# Patient Record
Sex: FEMALE | Race: WHITE | NOT HISPANIC OR LATINO | Employment: FULL TIME | URBAN - METROPOLITAN AREA
[De-identification: names, ages, dates, MRNs, and addresses within clinical notes are randomized per-mention and may not be internally consistent; named-entity substitution may affect disease eponyms.]

---

## 2023-05-30 ENCOUNTER — APPOINTMENT (EMERGENCY)
Dept: RADIOLOGY | Facility: HOSPITAL | Age: 28
End: 2023-05-30
Payer: COMMERCIAL

## 2023-05-30 ENCOUNTER — HOSPITAL ENCOUNTER (EMERGENCY)
Facility: HOSPITAL | Age: 28
Discharge: HOME | End: 2023-05-30
Attending: EMERGENCY MEDICINE
Payer: COMMERCIAL

## 2023-05-30 VITALS
OXYGEN SATURATION: 99 % | HEIGHT: 62 IN | HEART RATE: 91 BPM | DIASTOLIC BLOOD PRESSURE: 80 MMHG | TEMPERATURE: 97.5 F | SYSTOLIC BLOOD PRESSURE: 126 MMHG | WEIGHT: 235 LBS | RESPIRATION RATE: 18 BRPM | BODY MASS INDEX: 43.24 KG/M2

## 2023-05-30 DIAGNOSIS — D25.9 UTERINE LEIOMYOMA, UNSPECIFIED LOCATION: ICD-10-CM

## 2023-05-30 DIAGNOSIS — N83.201 CYST OF RIGHT OVARY: Primary | ICD-10-CM

## 2023-05-30 LAB
ALBUMIN SERPL-MCNC: 4.2 G/DL (ref 3.4–5)
ALP SERPL-CCNC: 61 IU/L (ref 35–126)
ALT SERPL-CCNC: 18 IU/L (ref 11–54)
ANION GAP SERPL CALC-SCNC: 8 MEQ/L (ref 3–15)
AST SERPL-CCNC: 21 IU/L (ref 15–41)
BASOPHILS # BLD: 0.03 K/UL (ref 0.01–0.1)
BASOPHILS NFR BLD: 0.3 %
BILIRUB SERPL-MCNC: 0.7 MG/DL (ref 0.3–1.2)
BILIRUB UR QL STRIP.AUTO: NEGATIVE MG/DL
BUN SERPL-MCNC: 11 MG/DL (ref 8–20)
CALCIUM SERPL-MCNC: 8.9 MG/DL (ref 8.9–10.3)
CHLORIDE SERPL-SCNC: 101 MEQ/L (ref 98–109)
CLARITY UR REFRACT.AUTO: CLEAR
CO2 SERPL-SCNC: 25 MEQ/L (ref 22–32)
COLOR UR AUTO: COLORLESS
CREAT SERPL-MCNC: 0.8 MG/DL (ref 0.6–1.1)
DIFFERENTIAL METHOD BLD: ABNORMAL
EOSINOPHIL # BLD: 0.14 K/UL (ref 0.04–0.36)
EOSINOPHIL NFR BLD: 1.4 %
ERYTHROCYTE [DISTWIDTH] IN BLOOD BY AUTOMATED COUNT: 12.1 % (ref 11.7–14.4)
GFR SERPL CREATININE-BSD FRML MDRD: >60 ML/MIN/1.73M*2
GLUCOSE SERPL-MCNC: 96 MG/DL (ref 70–99)
GLUCOSE UR STRIP.AUTO-MCNC: NEGATIVE MG/DL
HCG UR QL: NEGATIVE
HCT VFR BLDCO AUTO: 39.9 % (ref 35–45)
HGB BLD-MCNC: 13 G/DL (ref 11.8–15.7)
HGB UR QL STRIP.AUTO: NEGATIVE
IMM GRANULOCYTES # BLD AUTO: 0.06 K/UL (ref 0–0.08)
IMM GRANULOCYTES NFR BLD AUTO: 0.6 %
KETONES UR STRIP.AUTO-MCNC: ABNORMAL MG/DL
LEUKOCYTE ESTERASE UR QL STRIP.AUTO: NEGATIVE
LIPASE SERPL-CCNC: 23 U/L (ref 20–51)
LYMPHOCYTES # BLD: 2.15 K/UL (ref 1.2–3.5)
LYMPHOCYTES NFR BLD: 21.2 %
MCH RBC QN AUTO: 30.2 PG (ref 28–33.2)
MCHC RBC AUTO-ENTMCNC: 32.6 G/DL (ref 32.2–35.5)
MCV RBC AUTO: 92.8 FL (ref 83–98)
MONOCYTES # BLD: 0.6 K/UL (ref 0.28–0.8)
MONOCYTES NFR BLD: 5.9 %
NEUTROPHILS # BLD: 7.18 K/UL (ref 1.7–7)
NEUTS SEG NFR BLD: 70.6 %
NITRITE UR QL STRIP.AUTO: NEGATIVE
NRBC BLD-RTO: 0 %
PDW BLD AUTO: 11.1 FL (ref 9.4–12.3)
PH UR STRIP.AUTO: 6.5 [PH]
PLATELET # BLD AUTO: 249 K/UL (ref 150–369)
POTASSIUM SERPL-SCNC: 3.5 MEQ/L (ref 3.6–5.1)
PROT SERPL-MCNC: 8 G/DL (ref 6–8.2)
PROT UR QL STRIP.AUTO: NEGATIVE
RBC # BLD AUTO: 4.3 M/UL (ref 3.93–5.22)
SODIUM SERPL-SCNC: 134 MEQ/L (ref 136–144)
SP GR UR REFRACT.AUTO: >1.035
UROBILINOGEN UR STRIP-ACNC: 0.2 EU/DL
WBC # BLD AUTO: 10.16 K/UL (ref 3.8–10.5)

## 2023-05-30 PROCEDURE — 25800000 HC PHARMACY IV SOLUTIONS

## 2023-05-30 PROCEDURE — 3E0333Z INTRODUCTION OF ANTI-INFLAMMATORY INTO PERIPHERAL VEIN, PERCUTANEOUS APPROACH: ICD-10-PCS | Performed by: EMERGENCY MEDICINE

## 2023-05-30 PROCEDURE — 76857 US EXAM PELVIC LIMITED: CPT

## 2023-05-30 PROCEDURE — 36415 COLL VENOUS BLD VENIPUNCTURE: CPT

## 2023-05-30 PROCEDURE — 99284 EMERGENCY DEPT VISIT MOD MDM: CPT | Mod: 25

## 2023-05-30 PROCEDURE — G1004 CDSM NDSC: HCPCS

## 2023-05-30 PROCEDURE — 63600000 HC DRUGS/DETAIL CODE

## 2023-05-30 PROCEDURE — 96361 HYDRATE IV INFUSION ADD-ON: CPT | Mod: 59

## 2023-05-30 PROCEDURE — 81003 URINALYSIS AUTO W/O SCOPE: CPT | Performed by: EMERGENCY MEDICINE

## 2023-05-30 PROCEDURE — 76830 TRANSVAGINAL US NON-OB: CPT

## 2023-05-30 PROCEDURE — 96374 THER/PROPH/DIAG INJ IV PUSH: CPT | Mod: 59

## 2023-05-30 PROCEDURE — 63600105 HC IODINE BASED CONTRAST

## 2023-05-30 PROCEDURE — 83690 ASSAY OF LIPASE: CPT

## 2023-05-30 PROCEDURE — 93975 VASCULAR STUDY: CPT

## 2023-05-30 PROCEDURE — 84703 CHORIONIC GONADOTROPIN ASSAY: CPT | Performed by: EMERGENCY MEDICINE

## 2023-05-30 PROCEDURE — 80053 COMPREHEN METABOLIC PANEL: CPT | Performed by: EMERGENCY MEDICINE

## 2023-05-30 PROCEDURE — 85025 COMPLETE CBC W/AUTO DIFF WBC: CPT | Performed by: EMERGENCY MEDICINE

## 2023-05-30 RX ORDER — NAPROXEN 500 MG/1
500 TABLET ORAL 2 TIMES DAILY WITH MEALS
Qty: 12 TABLET | Refills: 0 | Status: SHIPPED | OUTPATIENT
Start: 2023-05-30 | End: 2023-06-05

## 2023-05-30 RX ORDER — KETOROLAC TROMETHAMINE 15 MG/ML
15 INJECTION, SOLUTION INTRAMUSCULAR; INTRAVENOUS ONCE
Status: COMPLETED | OUTPATIENT
Start: 2023-05-30 | End: 2023-05-30

## 2023-05-30 RX ADMIN — SODIUM CHLORIDE 1000 ML: 9 INJECTION, SOLUTION INTRAVENOUS at 18:49

## 2023-05-30 RX ADMIN — KETOROLAC TROMETHAMINE 15 MG: 15 INJECTION, SOLUTION INTRAMUSCULAR; INTRAVENOUS at 20:52

## 2023-05-30 RX ADMIN — IOHEXOL 100 ML: 350 INJECTION, SOLUTION INTRAVENOUS at 19:45

## 2023-05-30 ASSESSMENT — ENCOUNTER SYMPTOMS
DIARRHEA: 0
SHORTNESS OF BREATH: 0
WEAKNESS: 0
FREQUENCY: 0
BLOOD IN STOOL: 0
ARTHRALGIAS: 0
CHILLS: 0
MYALGIAS: 0
LIGHT-HEADEDNESS: 0
VOMITING: 0
NAUSEA: 0
DYSURIA: 0
HEMATURIA: 0
APPETITE CHANGE: 0
RHINORRHEA: 0
SORE THROAT: 0
ABDOMINAL PAIN: 1
BACK PAIN: 0
FEVER: 0

## 2023-05-30 NOTE — ED PROVIDER NOTES
Emergency Medicine Note  HPI   HISTORY OF PRESENT ILLNESS     29 y/o F without signif PMHx presents to the ED for evaluation of abd pain.  Pt states she awoke from sleep with sharp stabbing pain in lower abdomen.  Pain initially in right lower abdomen which migrated to LLQ.  Pain waxes and wanes.  Worse with lying flat.  Pt suspects pain may be related to leo she ate yesterday.  Denies fevers, N/V/D, black or bloody stools, dysuria, hematuria, urinary frequency, vaginal d/c or bleeding.  Similar episode of sx in the past for which pt had CT and most recently colonoscopy 2 weeks ago which was negative.  No hx ovarian cysts.  +IUD in place - does not regularly get period.  No prev abd surgeries.        History provided by:  Patient   used: No          Patient History   PAST HISTORY     Reviewed from Nursing Triage:       No past medical history on file.    No past surgical history on file.    No family history on file.           Review of Systems   REVIEW OF SYSTEMS     Review of Systems   Constitutional: Negative for appetite change, chills and fever.   HENT: Negative for congestion, rhinorrhea and sore throat.    Respiratory: Negative for shortness of breath.    Cardiovascular: Negative for chest pain.   Gastrointestinal: Positive for abdominal pain. Negative for blood in stool, diarrhea, nausea and vomiting.   Genitourinary: Negative for dysuria, frequency, hematuria, vaginal bleeding and vaginal discharge.   Musculoskeletal: Negative for arthralgias, back pain and myalgias.   Skin: Negative for rash.   Neurological: Negative for syncope, weakness and light-headedness.         VITALS     ED Vitals    Date/Time Temp Pulse Resp BP SpO2 [facility]   05/30/23 2229 36.4 °C (97.5 °F) 91 18 126/80 99 % KAK   05/30/23 1956 36.7 °C (98 °F) 97 18 139/74 99 % KAK   05/30/23 1743 36.6 °C (97.9 °F) 106 18 123/78 98 % CW        Pulse Ox %: 98 % (05/30/23 1818)  Pulse Ox Interpretation: Normal (05/30/23 1818)            Physical Exam   PHYSICAL EXAM     Physical Exam  Vitals and nursing note reviewed.   Constitutional:       General: She is not in acute distress.     Appearance: Normal appearance. She is well-developed.   HENT:      Head: Normocephalic.   Eyes:      Conjunctiva/sclera: Conjunctivae normal.   Cardiovascular:      Rate and Rhythm: Normal rate and regular rhythm.      Heart sounds: Normal heart sounds.   Pulmonary:      Effort: Pulmonary effort is normal.      Breath sounds: Normal breath sounds.   Abdominal:      Palpations: Abdomen is soft.      Tenderness: There is abdominal tenderness in the suprapubic area and left lower quadrant. There is no right CVA tenderness, left CVA tenderness, guarding or rebound.   Musculoskeletal:         General: Normal range of motion.      Cervical back: Neck supple.   Skin:     General: Skin is warm and dry.      Findings: No rash.   Neurological:      Mental Status: She is alert and oriented to person, place, and time. Mental status is at baseline.           PROCEDURES     Procedures     DATA     Results     Procedure Component Value Units Date/Time    UA with reflex culture [[id number]]  (Abnormal) Collected: 05/30/23 2012    Specimen: Urine, Clean Catch Updated: 05/30/23 2021    Narrative:      The following orders were created for panel order UA with reflex culture.  Procedure                               Abnormality         Status                     ---------                               -----------         ------                     UA Reflex to Culture (Ma...[[id number]]  Abnormal            Final result                 Please view results for these tests on the individual orders.    UA Reflex to Culture (Macroscopic) [[id number]]  (Abnormal) Collected: 05/30/23 2012    Specimen: Urine, Clean Catch Updated: 05/30/23 2021     Color, Urine Colorless     Clarity, Urine Clear     Specific Gravity, Urine >1.035     pH, Urine 6.5     Leukocyte Esterase Negative     Comment:  Results can be falsely negative due to high specific gravity, some antibiotics, glucose >3 g/dl, or WBC other than neutrophils.        Nitrite, Urine Negative     Protein, Urine Negative     Glucose, Urine Negative mg/dL      Ketones, Urine Trace mg/dL      Comment: Free sulfhydryl drugs such as Mesna, Capoten, and Acetylcysteine (Mucomyst) may cause false positive ketonuria.        Urobilinogen, Urine 0.2 EU/dL      Bilirubin, Urine Negative mg/dL      Blood, Urine Negative     Comment: The sensitivity of the occult blood test is equivalent to approximately 4 intact RBC/HPF.       Lipase [[id number]]  (Normal) Collected: 05/30/23 1755    Specimen: Blood, Venous Updated: 05/30/23 1912     Lipase 23 U/L     Comprehensive metabolic panel [[id number]]  (Abnormal) Collected: 05/30/23 1755    Specimen: Blood, Venous Updated: 05/30/23 1854     Sodium 134 mEQ/L      Potassium 3.5 mEQ/L      Comment: Results obtained on plasma. Plasma Potassium values may be up to 0.4 mEQ/L less than serum values. The differences may be greater for patients with high platelet or white cell counts.        Chloride 101 mEQ/L      CO2 25 mEQ/L      BUN 11 mg/dL      Creatinine 0.8 mg/dL      Glucose 96 mg/dL      Calcium 8.9 mg/dL      AST (SGOT) 21 IU/L      ALT (SGPT) 18 IU/L      Alkaline Phosphatase 61 IU/L      Total Protein 8.0 g/dL      Comment: Test performed on plasma which typically contains approximately 0.4 g/dL more protein than serum.        Albumin 4.2 g/dL      Bilirubin, Total 0.7 mg/dL      eGFR >60.0 mL/min/1.73m*2      Anion Gap 8 mEQ/L     BhCG, Serum, Qual [[id number]]  (Normal) Collected: 05/30/23 1755    Specimen: Blood, Venous Updated: 05/30/23 1849     Preg Test, Serum Negative    CBC and differential [[id number]]  (Abnormal) Collected: 05/30/23 1755    Specimen: Blood, Venous Updated: 05/30/23 1818     WBC 10.16 K/uL      RBC 4.30 M/uL      Hemoglobin 13.0 g/dL      Hematocrit 39.9 %      MCV 92.8 fL      MCH 30.2 pg       MCHC 32.6 g/dL      RDW 12.1 %      Platelets 249 K/uL      MPV 11.1 fL      Differential Type Auto     nRBC 0.0 %      Immature Granulocytes 0.6 %      Neutrophils 70.6 %      Lymphocytes 21.2 %      Monocytes 5.9 %      Eosinophils 1.4 %      Basophils 0.3 %      Immature Granulocytes, Absolute 0.06 K/uL      Neutrophils, Absolute 7.18 K/uL      Lymphocytes, Absolute 2.15 K/uL      Monocytes, Absolute 0.60 K/uL      Eosinophils, Absolute 0.14 K/uL      Basophils, Absolute 0.03 K/uL           Imaging Results          ULTRASOUND PELVIS LIMITED TRANSABDOMINAL ONLY (Final result)  Result time 05/30/23 21:56:45    Final result                 Impression:    IMPRESSION:  No evidence for ovarian torsion.  Simple cyst in the right ovary.  1.2 cm left mid body intramural fibroid.               Narrative:      CLINICAL HISTORY:  cyst on CT    COMPARISON: CT performed earlier in the day    COMMENT: Transabdominal limited pelvic ultrasound and transvaginal examination  of the pelvis is performed.  Transabdominal ultrasound was performed to allow a  broad examination of the pelvis to visualize structures that cannot be seen with  transvaginal imaging alone.  Transvaginal scans were performed for better  delineation of the pelvic structures, particularly the endometrium and adnexa.  Dedicated Doppler imaging of the ovaries is also performed.    Uterus: The uterus measures 4.0 x  4.6 x  8.3 cm. The parenchyma is  heterogeneous with a left mid body intramural fibroid measuring 1.2 cm in  maximal dimension.  Endometrium: The endometrial stripe measures 0.9   in  maximum thickness.  There is no endometrial mass or abnormal vascularity of the  endometrium.  IUD in satisfactory position.    Right ovary: The right ovary measures 3.3  x 3.5 x 4.5 cm.  There is a simple  cyst in the right ovary which measures 1.9 x 2.0 x 1.9 cm.  Normal arterial and  venous waveforms in the right ovary.    Left ovary: The left ovary measures 1.3 x 2.4  x 2.5  cm.   The left ovary is  normal in size and echotexture.. Arterial and venous waveforms are identified in  the left of    Peritoneum: There is small simple free fluid in the pelvis.    Additional findings: Ovaries best seen transvaginally                               ULTRASOUND PELVIS TRANSVAGINAL ONLY (Final result)  Result time 05/30/23 21:56:45    Final result                 Impression:    IMPRESSION:  No evidence for ovarian torsion.  Simple cyst in the right ovary.  1.2 cm left mid body intramural fibroid.               Narrative:      CLINICAL HISTORY:  cyst on CT    COMPARISON: CT performed earlier in the day    COMMENT: Transabdominal limited pelvic ultrasound and transvaginal examination  of the pelvis is performed.  Transabdominal ultrasound was performed to allow a  broad examination of the pelvis to visualize structures that cannot be seen with  transvaginal imaging alone.  Transvaginal scans were performed for better  delineation of the pelvic structures, particularly the endometrium and adnexa.  Dedicated Doppler imaging of the ovaries is also performed.    Uterus: The uterus measures 4.0 x  4.6 x  8.3 cm. The parenchyma is  heterogeneous with a left mid body intramural fibroid measuring 1.2 cm in  maximal dimension.  Endometrium: The endometrial stripe measures 0.9   in  maximum thickness.  There is no endometrial mass or abnormal vascularity of the  endometrium.  IUD in satisfactory position.    Right ovary: The right ovary measures 3.3  x 3.5 x 4.5 cm.  There is a simple  cyst in the right ovary which measures 1.9 x 2.0 x 1.9 cm.  Normal arterial and  venous waveforms in the right ovary.    Left ovary: The left ovary measures 1.3 x 2.4 x 2.5  cm.   The left ovary is  normal in size and echotexture.. Arterial and venous waveforms are identified in  the left of    Peritoneum: There is small simple free fluid in the pelvis.    Additional findings: Ovaries best seen transvaginally                                ULTRASOUND DOPPLER (Final result)  Result time 05/30/23 21:56:45    Final result                 Impression:    IMPRESSION:  No evidence for ovarian torsion.  Simple cyst in the right ovary.  1.2 cm left mid body intramural fibroid.               Narrative:      CLINICAL HISTORY:  cyst on CT    COMPARISON: CT performed earlier in the day    COMMENT: Transabdominal limited pelvic ultrasound and transvaginal examination  of the pelvis is performed.  Transabdominal ultrasound was performed to allow a  broad examination of the pelvis to visualize structures that cannot be seen with  transvaginal imaging alone.  Transvaginal scans were performed for better  delineation of the pelvic structures, particularly the endometrium and adnexa.  Dedicated Doppler imaging of the ovaries is also performed.    Uterus: The uterus measures 4.0 x  4.6 x  8.3 cm. The parenchyma is  heterogeneous with a left mid body intramural fibroid measuring 1.2 cm in  maximal dimension.  Endometrium: The endometrial stripe measures 0.9   in  maximum thickness.  There is no endometrial mass or abnormal vascularity of the  endometrium.  IUD in satisfactory position.    Right ovary: The right ovary measures 3.3  x 3.5 x 4.5 cm.  There is a simple  cyst in the right ovary which measures 1.9 x 2.0 x 1.9 cm.  Normal arterial and  venous waveforms in the right ovary.    Left ovary: The left ovary measures 1.3 x 2.4 x 2.5  cm.   The left ovary is  normal in size and echotexture.. Arterial and venous waveforms are identified in  the left of    Peritoneum: There is small simple free fluid in the pelvis.    Additional findings: Ovaries best seen transvaginally                               CT ABDOMEN PELVIS WITH IV CONTRAST (Final result)  Result time 05/30/23 20:04:49    Final result                 Impression:    IMPRESSION:  1.  No evidence for acute appendicitis or diverticulitis.  2.  Small amount of complex free fluid in the pelvis with a  2.8 cm right adnexal  cyst.  Further assessment can be considered with pelvic ultrasound if clinically  appropriate.    COMMENT:    Technique: CT examination of the abdomen and pelvis was performed utilizing  contiguous 2.5 mm transaxial sections. Images were acquired  following the  administration of 100 cc Omnipaque 350 intravenous contrast.  Post contrast  imaging was obtained in the arterial and venous phases of enhancement.  Oral  contrast was not administered.  Coronal and sagittal reformations are obtained.      CT DOSE:  One or more dose reduction techniques (e.g. automated exposure  control, adjustment of the mA and/or kV according to patient size, use of  iterative reconstruction technique) utilized for this examination    Comparison studies: None.    Lung bases: Lung bases are clear    Liver: The liver is normal in size.  There is no focal mass.  Hepatic veins and  portal veins are patent without focal filling defects to suggest thrombosis..    Gallbladder:  No gallstones.  No gallbladder wall thickening..    Spleen: Spleen is normal in size without focal mass lesion..    Pancreas: The pancreas is normal without focal mass, parenchymal atrophy, or  pancreatic duct dilation..    Adrenals: The adrenals are normal bilaterally without focal mass..    Kidneys, ureters and bladder: No hydronephrosis.  No renal calculi.  No evidence  for solid mass lesion.  The bladder is normal..    Retroperitoneal structures: There is no abdominal aortic aneurysm.  There is no  retroperitoneal adenopathy or retroperitoneal mass..    Bowel and mesentery: No evidence of a focal inflammatory or obstructive process.  Normal appendix.  No diverticulitis.    Lymph nodes: No pathologic lymphadenopathy..    Pelvis: The uterus is normal in size.  IUD in place.  2.8 cm right ovarian cyst.  There is a small amount of pelvic free fluid.  This measures above simple fluid  density in keeping with a complex fluid.    Bones: Within normal  limits.             Narrative:    CLINICAL HISTORY: LLQ abdominal pain                                No orders to display       Scoring tools                                  ED Course & MDM   MDM / ED COURSE / CLINICAL IMPRESSION / DISPO     Medical Decision Making  Cyst of right ovary: acute illness or injury  Uterine leiomyoma, unspecified location: acute illness or injury  Amount and/or Complexity of Data Reviewed  Labs: ordered.  Radiology: ordered. Decision-making details documented in ED Course.      Risk  Prescription drug management.          ED Course as of 05/30/23 2302   Tue May 30, 2023   1850 Declines antiemetics/analgesia [EG]   2022 CT ABDOMEN PELVIS WITH IV CONTRAST  CLINICAL HISTORY: LLQ abdominal pain     --  IMPRESSION:  1.  No evidence for acute appendicitis or diverticulitis.  2.  Small amount of complex free fluid in the pelvis with a 2.8 cm right adnexal  cyst.  Further assessment can be considered with pelvic ultrasound if clinically  appropriate.    [EG]   2109 Updated - awaiting US.  Toradol ordered.   [EG]   2209 ULTRASOUND PELVIS TRANSVAGINAL ONLY     --  IMPRESSION:  No evidence for ovarian torsion.  Simple cyst in the right ovary.  1.2 cm left mid body intramural fibroid. [EG]   2220 Patient updated on the results of work up.  Agreeable with plan for discharge home with close follow up.  Strict return precautions discussed.     [EG]      ED Course User Index  [EG] Ella Huang PA C     Clinical Impression      Cyst of right ovary   Uterine leiomyoma, unspecified location     _________________     ED Disposition   Discharge                   Ella Huang PA C  05/30/23 2303

## 2023-05-31 NOTE — DISCHARGE INSTRUCTIONS
Follow-up with your primary care doctor and OB/GYN for further monitoring of symptoms.  Take ibuprofen or Tylenol as needed for pain.  Rest.  Apply heating pad or ice to the area.  Return to the ER for evaluation of worsening abdominal pain, fevers, vomiting, inability to eat or drink, chest pain, shortness of breath or any other concerns.

## 2023-06-01 NOTE — ED ATTESTATION NOTE
I have personally seen and examined the patient.  I personally performed the key components of the encounter and provided a substantive portion of the care and medical decision making for this patient.  I reviewed and agree with physician assistant / nurse practitioner’s assessment and plan of care, with the following exceptions: None  My examination, assessment, and plan of care of Rena Nogueira is as follows:     Exam: After ultrasound, well-appearing, no acute distress awake alert oriented x3, abdomen soft, mild right lower quadrant tenderness to palpation without guarding or rebound, normal pulses    Assessment and plan: Patient presents with lower abdominal pain, right greater than left that is been constant since it started though it waxes and wanes, she has had similar symptoms previously though more brief in nature, she associates the pain with eating leo, lab work reassuring, CT shows small amount of complex free fluid in the abdomen with right adnexal cyst, ultrasound performed showing right ovarian cyst, symptomatic treatment and outpatient follow-up given strict return and follow-up instructions    Differential diagnoses considered but not limited to, include: Ovarian cyst, ovarian torsion, kidney stone, appendicitis, enteritis     Brad Peralta, DO  05/31/23 5450

## 2023-11-06 ENCOUNTER — APPOINTMENT (EMERGENCY)
Dept: RADIOLOGY | Facility: HOSPITAL | Age: 28
End: 2023-11-06
Payer: COMMERCIAL

## 2023-11-06 ENCOUNTER — HOSPITAL ENCOUNTER (EMERGENCY)
Facility: HOSPITAL | Age: 28
Discharge: HOME | End: 2023-11-06
Attending: EMERGENCY MEDICINE
Payer: COMMERCIAL

## 2023-11-06 VITALS
SYSTOLIC BLOOD PRESSURE: 133 MMHG | TEMPERATURE: 98.6 F | WEIGHT: 230 LBS | RESPIRATION RATE: 20 BRPM | HEART RATE: 94 BPM | DIASTOLIC BLOOD PRESSURE: 77 MMHG | BODY MASS INDEX: 40.75 KG/M2 | HEIGHT: 63 IN | OXYGEN SATURATION: 95 %

## 2023-11-06 DIAGNOSIS — S29.011A MUSCLE STRAIN OF CHEST WALL, INITIAL ENCOUNTER: Primary | ICD-10-CM

## 2023-11-06 DIAGNOSIS — R05.1 ACUTE COUGH: ICD-10-CM

## 2023-11-06 LAB
ALBUMIN SERPL-MCNC: 4.2 G/DL (ref 3.5–5.7)
ALP SERPL-CCNC: 66 IU/L (ref 34–125)
ALT SERPL-CCNC: 14 IU/L (ref 7–52)
ANION GAP SERPL CALC-SCNC: 9 MEQ/L (ref 3–15)
AST SERPL-CCNC: 18 IU/L (ref 13–39)
ATRIAL RATE: 104
BASOPHILS # BLD: 0.05 K/UL (ref 0.01–0.1)
BASOPHILS NFR BLD: 0.4 %
BILIRUB SERPL-MCNC: 0.7 MG/DL (ref 0.3–1.2)
BUN SERPL-MCNC: 10 MG/DL (ref 7–25)
CALCIUM SERPL-MCNC: 9.3 MG/DL (ref 8.6–10.3)
CHLORIDE SERPL-SCNC: 102 MEQ/L (ref 98–107)
CO2 SERPL-SCNC: 27 MEQ/L (ref 21–31)
CREAT SERPL-MCNC: 0.8 MG/DL (ref 0.6–1.2)
D DIMER PPP IA.FEU-MCNC: 0.46 UG/ML FEU (ref 0–0.5)
DIFFERENTIAL METHOD BLD: ABNORMAL
EOSINOPHIL # BLD: 0.54 K/UL (ref 0.04–0.36)
EOSINOPHIL NFR BLD: 4 %
ERYTHROCYTE [DISTWIDTH] IN BLOOD BY AUTOMATED COUNT: 12.5 % (ref 11.7–14.4)
FLUAV RNA SPEC QL NAA+PROBE: NEGATIVE
FLUBV RNA SPEC QL NAA+PROBE: NEGATIVE
GFR SERPL CREATININE-BSD FRML MDRD: >60 ML/MIN/1.73M*2
GLUCOSE SERPL-MCNC: 95 MG/DL (ref 70–99)
HCG UR QL: NEGATIVE
HCT VFR BLDCO AUTO: 43.1 % (ref 35–45)
HGB BLD-MCNC: 13.7 G/DL (ref 11.8–15.7)
IMM GRANULOCYTES # BLD AUTO: 0.1 K/UL (ref 0–0.08)
IMM GRANULOCYTES NFR BLD AUTO: 0.7 %
LYMPHOCYTES # BLD: 3.46 K/UL (ref 1.2–3.5)
LYMPHOCYTES NFR BLD: 25.6 %
MCH RBC QN AUTO: 29.6 PG (ref 28–33.2)
MCHC RBC AUTO-ENTMCNC: 31.8 G/DL (ref 32.2–35.5)
MCV RBC AUTO: 93.1 FL (ref 83–98)
MONOCYTES # BLD: 0.94 K/UL (ref 0.28–0.8)
MONOCYTES NFR BLD: 7 %
NEUTROPHILS # BLD: 8.42 K/UL (ref 1.7–7)
NEUTS SEG NFR BLD: 62.3 %
NRBC BLD-RTO: 0 %
P AXIS: 68
PDW BLD AUTO: 10.7 FL (ref 9.4–12.3)
PLATELET # BLD AUTO: 286 K/UL (ref 150–369)
POTASSIUM SERPL-SCNC: 3.8 MEQ/L (ref 3.5–5.1)
PR INTERVAL: 130
PROT SERPL-MCNC: 7.7 G/DL (ref 6–8.2)
QRS DURATION: 74
QT INTERVAL: 338
QTC CALCULATION(BAZETT): 444
R AXIS: 78
RBC # BLD AUTO: 4.63 M/UL (ref 3.93–5.22)
RSV RNA SPEC QL NAA+PROBE: NEGATIVE
SARS-COV-2 RNA RESP QL NAA+PROBE: NEGATIVE
SODIUM SERPL-SCNC: 138 MEQ/L (ref 136–145)
T WAVE AXIS: 9
TROPONIN I SERPL HS-MCNC: 3.3 PG/ML
VENTRICULAR RATE: 104
WBC # BLD AUTO: 13.51 K/UL (ref 3.8–10.5)

## 2023-11-06 PROCEDURE — 71046 X-RAY EXAM CHEST 2 VIEWS: CPT

## 2023-11-06 PROCEDURE — 85379 FIBRIN DEGRADATION QUANT: CPT | Performed by: PHYSICIAN ASSISTANT

## 2023-11-06 PROCEDURE — 3E0333Z INTRODUCTION OF ANTI-INFLAMMATORY INTO PERIPHERAL VEIN, PERCUTANEOUS APPROACH: ICD-10-PCS | Performed by: EMERGENCY MEDICINE

## 2023-11-06 PROCEDURE — 87637 SARSCOV2&INF A&B&RSV AMP PRB: CPT | Performed by: PHYSICIAN ASSISTANT

## 2023-11-06 PROCEDURE — 84703 CHORIONIC GONADOTROPIN ASSAY: CPT | Performed by: EMERGENCY MEDICINE

## 2023-11-06 PROCEDURE — 63700000 HC SELF-ADMINISTRABLE DRUG: Performed by: PHYSICIAN ASSISTANT

## 2023-11-06 PROCEDURE — 96374 THER/PROPH/DIAG INJ IV PUSH: CPT

## 2023-11-06 PROCEDURE — 99284 EMERGENCY DEPT VISIT MOD MDM: CPT | Mod: 25

## 2023-11-06 PROCEDURE — 93005 ELECTROCARDIOGRAM TRACING: CPT | Performed by: EMERGENCY MEDICINE

## 2023-11-06 PROCEDURE — 80053 COMPREHEN METABOLIC PANEL: CPT | Performed by: EMERGENCY MEDICINE

## 2023-11-06 PROCEDURE — 63600000 HC DRUGS/DETAIL CODE: Performed by: PHYSICIAN ASSISTANT

## 2023-11-06 PROCEDURE — 85025 COMPLETE CBC W/AUTO DIFF WBC: CPT | Performed by: EMERGENCY MEDICINE

## 2023-11-06 PROCEDURE — 96376 TX/PRO/DX INJ SAME DRUG ADON: CPT

## 2023-11-06 PROCEDURE — 84484 ASSAY OF TROPONIN QUANT: CPT | Performed by: EMERGENCY MEDICINE

## 2023-11-06 PROCEDURE — 36415 COLL VENOUS BLD VENIPUNCTURE: CPT | Performed by: PHYSICIAN ASSISTANT

## 2023-11-06 RX ORDER — LIDOCAINE 560 MG/1
1 PATCH PERCUTANEOUS; TOPICAL; TRANSDERMAL ONCE
Status: DISCONTINUED | OUTPATIENT
Start: 2023-11-06 | End: 2023-11-06 | Stop reason: HOSPADM

## 2023-11-06 RX ORDER — ALBUTEROL SULFATE 90 UG/1
2 INHALANT RESPIRATORY (INHALATION) ONCE
Status: DISCONTINUED | OUTPATIENT
Start: 2023-11-06 | End: 2023-11-06

## 2023-11-06 RX ORDER — ACETAMINOPHEN 325 MG/1
975 TABLET ORAL ONCE
Status: COMPLETED | OUTPATIENT
Start: 2023-11-06 | End: 2023-11-06

## 2023-11-06 RX ORDER — DIAZEPAM 5 MG/1
5 TABLET ORAL ONCE
Status: COMPLETED | OUTPATIENT
Start: 2023-11-06 | End: 2023-11-06

## 2023-11-06 RX ORDER — KETOROLAC TROMETHAMINE 15 MG/ML
15 INJECTION, SOLUTION INTRAMUSCULAR; INTRAVENOUS ONCE
Status: COMPLETED | OUTPATIENT
Start: 2023-11-06 | End: 2023-11-06

## 2023-11-06 RX ORDER — DIAZEPAM 5 MG/1
5 TABLET ORAL NIGHTLY PRN
Qty: 6 TABLET | Refills: 0 | Status: SHIPPED | OUTPATIENT
Start: 2023-11-06

## 2023-11-06 RX ADMIN — ACETAMINOPHEN 975 MG: 325 TABLET ORAL at 15:53

## 2023-11-06 RX ADMIN — KETOROLAC TROMETHAMINE 15 MG: 15 INJECTION, SOLUTION INTRAMUSCULAR; INTRAVENOUS at 15:52

## 2023-11-06 RX ADMIN — DIAZEPAM 5 MG: 5 TABLET ORAL at 14:59

## 2023-11-06 RX ADMIN — KETOROLAC TROMETHAMINE 15 MG: 15 INJECTION, SOLUTION INTRAMUSCULAR; INTRAVENOUS at 12:13

## 2023-11-06 RX ADMIN — LIDOCAINE 1 PATCH: 4 PATCH TOPICAL at 12:08

## 2023-11-06 NOTE — DISCHARGE INSTRUCTIONS
You can take your prescribed 800 mg of ibuprofen/Motrin every 8 hours as needed for pain    You can take 1 g of Tylenol every 6-8 hours as needed for pain    Return to the emergency department for worsening of symptoms or if you develop any new concerning symptoms     Follow up with your family doctor within 48 hours for re-evaluation and further treatment and monitoring.     You can use your albuterol inhaler 2 puffs every 4-6 hours as needed for the cough/shortness of breath    You can apply ice for 15 minutes at a time to area of discomfort for the first 48 to 72 hours and then add heat for the same amount of time afterwards    You can apply an over-the-counter lidocaine patch to area of discomfort as instructed on the box, it is usually 12 hours on 12 hours off  
n/a

## 2023-11-06 NOTE — ED ATTESTATION NOTE
I have personally seen and examined the patient. I personally performed the key components of the encounter and provided a substantive portion of the care and medical decision making. I reviewed and agree with the physician assistants assessment and plan of care, except as where stated below.    My examination, assessment, and plan of care of Rena Nogueira is as follows:     Patient is a 28-year-old female who presents emergency department for evaluation of right mid back pain associated with coughing.  Patient states that she started last month with symptoms.  She went to an urgent care center on October 21, was diagnosed with pneumonia.  She was prescribed Levaquin, Tessalon Perles.  She followed up with her PCP, was given albuterol as well.  Patient finished her antibiotics just over a week ago.  She still has the cough.  Patient states that this morning she was coughing and she felt a pop in her right mid/lateral back.  Since that time, she has had severe pain to the area and now she finds it very painful to cough.    On exam, patient is awake, alert.  Heart rate is normal, rhythm is regular.  Respirations are nonlabored, she does have some rhonchi in the bases of her lungs.  She has significant reproducible tenderness to palpation to the right lateral lower ribs.  She has no crepitus, bruising, deformity.  Abdomen is soft, nontender.  No lower extremity edema.    On arrival, patient is mildly tachycardic.  Work-up was obtained as noted in the chart.  Given the tachycardia, recent travel, D-dimer was obtained.  This is normal.  Suspect musculoskeletal pain given the coughing and reproducible tenderness.  Patient was given Toradol, fluids.  Lab work checked, without significant abnormalities.  Chest x-ray does not have any pneumonia or other findings to explain her symptoms at this point.       Leona Moran MD  11/06/23 4295

## 2023-11-06 NOTE — Clinical Note
Rena Nogueira was seen and treated in our emergency department on 11/6/2023.  Rena Nogueira may return to work on 11/08/2023.       If you have any questions or concerns, please don't hesitate to call.      Otilia Parekh PA C

## 2023-11-09 ASSESSMENT — ENCOUNTER SYMPTOMS
EYE PAIN: 0
SORE THROAT: 0
VOMITING: 0
BLOOD IN STOOL: 0
DIARRHEA: 0
BACK PAIN: 1
SHORTNESS OF BREATH: 1
FEVER: 0
HEADACHES: 0
HEMATURIA: 0
ABDOMINAL PAIN: 0
COUGH: 1

## 2023-11-10 NOTE — ED PROVIDER NOTES
Emergency Medicine Note  HPI   HISTORY OF PRESENT ILLNESS     27 y/o F with no significant pmhx presents to the ED for evaluation of back pain. She reports being dx with pneumonia on 10/21 after presenting with cough, fatigue and fever. She was prescribed Levaquin and tessalon pearles followed by medrol dose pack and albuterol inhaler on 10/26 and another medrol dosepack on 11/2. She reports still having a residual cough and this morning she she developed sudden right mid back pain/felt a pop after coughing. Pain worsens with moving and breathing. Associated with SOB. She also returned home from a trip to [place] this week. She has Kyleena IUD in place. Denies hemoptysis, hx of blood clots, leg swelling or calf pain, recent surgery, tobacco use, drug use, cp,  fainting, n/v/d, abdominal pain, back trauma.      History provided by:  Patient and medical records  Back Pain  Associated symptoms: no abdominal pain, no chest pain, no fever and no headaches    Shortness of Breath  Associated symptoms: cough    Associated symptoms: no abdominal pain, no chest pain, no fever, no headaches, no rash, no sore throat and no vomiting          Patient History   PAST HISTORY     Reviewed from Nursing Triage:       No past medical history on file.    No past surgical history on file.    No family history on file.           Review of Systems   REVIEW OF SYSTEMS     Review of Systems   Constitutional: Negative for fever.   HENT: Negative for sore throat.    Eyes: Negative for pain.   Respiratory: Positive for cough and shortness of breath.    Cardiovascular: Negative for chest pain and leg swelling.   Gastrointestinal: Negative for abdominal pain, blood in stool, diarrhea and vomiting.   Genitourinary: Negative for hematuria.   Musculoskeletal: Positive for back pain.   Skin: Negative for rash.   Allergic/Immunologic: Negative for immunocompromised state.   Neurological: Negative for syncope and headaches.         VITALS     ED  Vitals    Date/Time Temp Pulse Resp BP SpO2 [facility]   11/06/23 1600 -- 94 20 133/77 95 % CC   11/06/23 1458 -- 92 20 141/85 97 % CC   11/06/23 1415 -- 86 -- -- 93 % CC   11/06/23 1316 -- 92 20 144/71 95 % CC   11/06/23 1224 -- 98 20 118/67 94 % GT   11/06/23 1104 37 °C (98.6 °F) 110 25 152/92 95 % SDN        Pulse Ox %: 94 % (11/06/23 1224)  Pulse Ox Interpretation: Normal (11/06/23 1224)  Heart Rate: 98 (11/06/23 1224)  Rhythm Strip Interpretation: Normal Sinus Rhythm (11/06/23 1224)     Physical Exam   PHYSICAL EXAM     Physical Exam  Vitals and nursing note reviewed.   Constitutional:       Appearance: She is not ill-appearing.      Comments: Appears uncomfortable 2/2 pain   HENT:      Head: Normocephalic.      Nose: Nose normal.      Mouth/Throat:      Mouth: Mucous membranes are moist.   Eyes:      Conjunctiva/sclera: Conjunctivae normal.   Neck:      Comments: significant reproducible right lateral rib TTP, normal skin inspection, no crepitus   Cardiovascular:      Rate and Rhythm: Regular rhythm. Tachycardia present.      Heart sounds: No murmur heard.  Pulmonary:      Effort: No tachypnea or respiratory distress.      Breath sounds: No stridor. Examination of the right-lower field reveals decreased breath sounds. Examination of the left-lower field reveals decreased breath sounds. Decreased breath sounds present. No wheezing, rhonchi or rales.      Comments: Poor deep breath effort 2/2 rib pain  Chest:      Chest wall: Tenderness present.   Abdominal:      Palpations: Abdomen is soft.      Tenderness: There is no abdominal tenderness. There is no right CVA tenderness, left CVA tenderness, guarding or rebound.   Musculoskeletal:      Cervical back: Neck supple.      Right lower leg: No edema.      Left lower leg: No edema.      Comments: No calf TTP BL    Back no midline TTP   Skin:     General: Skin is warm and dry.   Neurological:      Mental Status: She is alert.   Psychiatric:         Mood and Affect: Mood is  anxious.           PROCEDURES     Procedures     DATA     Results     Procedure Component Value Units Date/Time    SARS-CoV-2 (COVID-19), PCR Nasopharynx [[id number]]  (Normal) Collected: 11/06/23 1358    Specimen: Nasopharyngeal Swab from Nasopharynx Updated: 11/06/23 1502    Narrative:      The following orders were created for panel order SARS-CoV-2 (COVID-19), PCR Nasopharynx.  Procedure                               Abnormality         Status                     ---------                               -----------         ------                     SARS-COV-2 (COVID-19)/ F...[[id number]]  Normal              Final result                 Please view results for these tests on the individual orders.    SARS-COV-2 (COVID-19)/ FLU A/B, AND RSV, PCR Nasopharynx [[id number]]  (Normal) Collected: 11/06/23 1358    Specimen: Nasopharyngeal Swab from Nasopharynx Updated: 11/06/23 1502     SARS-CoV-2 (COVID-19) Negative     Influenza A Negative     Influenza B Negative     Respiratory Syncytial Virus Negative    Narrative:      Testing performed using real-time PCR for detection of COVID-19. EUA approved validation studies performed on site.     HS Troponin I (with 2 hour reflex) [[id number]]  (Normal) Collected: 11/06/23 1212    Specimen: Blood, Venous Updated: 11/06/23 1414     High Sens Troponin I 3.3 pg/mL     Comprehensive metabolic panel [[id number]]  (Normal) Collected: 11/06/23 1212    Specimen: Blood, Venous Updated: 11/06/23 1247     Sodium 138 mEQ/L      Potassium 3.8 mEQ/L      Comment: Results obtained on plasma. Plasma Potassium values may be up to 0.4 mEQ/L less than serum values. The differences may be greater for patients with high platelet or white cell counts.        Chloride 102 mEQ/L      CO2 27 mEQ/L      BUN 10 mg/dL      Creatinine 0.8 mg/dL      Glucose 95 mg/dL      Calcium 9.3 mg/dL      AST (SGOT) 18 IU/L      ALT (SGPT) 14 IU/L      Alkaline Phosphatase 66 IU/L      Total Protein 7.7 g/dL       Comment: Test performed on plasma which typically contains approximately 0.4 g/dL more protein than serum.        Albumin 4.2 g/dL      Bilirubin, Total 0.7 mg/dL      eGFR >60.0 mL/min/1.73m*2      Anion Gap 9 mEQ/L     BhCG, Serum, Qual (if menstruating female and no urine) [[id number]]  (Normal) Collected: 11/06/23 1212    Specimen: Blood, Venous Updated: 11/06/23 1238     Preg Test, Serum Negative    CBC and differential [[id number]]  (Abnormal) Collected: 11/06/23 1212    Specimen: Blood, Venous Updated: 11/06/23 1235     WBC 13.51 K/uL      RBC 4.63 M/uL      Hemoglobin 13.7 g/dL      Hematocrit 43.1 %      MCV 93.1 fL      MCH 29.6 pg      MCHC 31.8 g/dL      RDW 12.5 %      Platelets 286 K/uL      MPV 10.7 fL      Differential Type Auto     nRBC 0.0 %      Immature Granulocytes 0.7 %      Neutrophils 62.3 %      Lymphocytes 25.6 %      Monocytes 7.0 %      Eosinophils 4.0 %      Basophils 0.4 %      Immature Granulocytes, Absolute 0.10 K/uL      Neutrophils, Absolute 8.42 K/uL      Lymphocytes, Absolute 3.46 K/uL      Monocytes, Absolute 0.94 K/uL      Eosinophils, Absolute 0.54 K/uL      Basophils, Absolute 0.05 K/uL     D-dimer, quantitative [[id number]]  (Normal) Collected: 11/06/23 1212    Specimen: Blood, Venous Updated: 11/06/23 1235     D-Dimer, Quant 0.46 ug/mL FEU      Comment: The D-Dimer assay can be used as an aid in the diagnosis of DVT or PE. The test can not be used by itself to exclude DVT or PE. When used as a diagnostic aid, the cutoff value is the same as the reference range: <0.5 ug/ml FEU.             Imaging Results          X-RAY CHEST 2 VIEWS (Final result)  Result time 11/06/23 11:43:00    Final result                 Impression:    IMPRESSION:  No acute disease in the chest.             Narrative:    CLINICAL HISTORY: Chest Pain/Arrhythmia    COMMENT:  Technique: Frontal and lateral views of the chest were obtained.  Comparison: None.    The lungs are clear and without consolidation,  effusion or pneumothorax.  The heart size is normal. The hilar and mediastinal contours are normal.  The regional skeletal structures are unremarkable.                                ECG 12 lead          Scoring tools                                  ED Course & MDM   MDM / ED COURSE / CLINICAL IMPRESSION / DISPO     Medical Decision Making  ddx considered but not limited to rib fracture, chest wall strain, pneumothorax, PE, pneumonia, bronchitis, covid, flu, rsv    Amount and/or Complexity of Data Reviewed  External Data Reviewed: labs and notes.  Labs: ordered. Decision-making details documented in ED Course.  Radiology: ordered. Decision-making details documented in ED Course.  ECG/medicine tests: ordered. Decision-making details documented in ED Course.      Risk  OTC drugs.  Prescription drug management.          ED Course as of 11/09/23 2339   Mon Nov 06, 2023   1147 X-RAY CHEST 2 VIEWS  IMPRESSION:  No acute disease in the chest. [TC]   1220 EKG interpretation 104 sinus tachycardia, normal axis, nonspecific ST wave abnormality, normal QT/Qtc reviewed with Dr Moran [TC]   1314 D-Dimer, Quant: 0.46 [TC]   1316 WBC(!): 13.51  Pt was recently on 2 rounds of steroids  [TC]   1316 Cp wnl [TC]   1423 High Sens Troponin I: 3.3  Do not suspect ACS [TC]   1423 Valium ordered for likely muscle spasm from coughing in respect to her rib discomfort while waiting for COVID/flu/RSV testing [TC]   1517 Negative COVID/flu/RSV.  Suspect musculoskeletal strain of ribs secondary to coughing. [TC]   1548 Patient complaining of worsening right posterior rib pain, additional Toradol and p.o. Tylenol ordered for pain  [TC]   1558 Patient and father updated on recommendations, comfortable with discharge plan.  She has 800 mg of ibuprofen to take at home.  We will give prescription for Valium to take at night.  Discussed no driving, operating machinery, driving or drinking alcohol while taking it.  She will also get a rib brace to help  with discomfort.  Close follow-up with family doctor advised. [TC]      ED Course User Index  [TC] Otilia Parekh PA C     Clinical Impression      Muscle strain of chest wall, initial encounter   Acute cough     _________________     ED Disposition   Discharge                   Otilia Parekh PA C  11/09/23 9993

## 2024-01-01 ENCOUNTER — APPOINTMENT (EMERGENCY)
Dept: CT IMAGING | Facility: HOSPITAL | Age: 29
DRG: 189 | End: 2024-01-01
Payer: COMMERCIAL

## 2024-01-01 ENCOUNTER — APPOINTMENT (EMERGENCY)
Dept: RADIOLOGY | Facility: HOSPITAL | Age: 29
DRG: 189 | End: 2024-01-01
Payer: COMMERCIAL

## 2024-01-01 ENCOUNTER — HOSPITAL ENCOUNTER (INPATIENT)
Facility: HOSPITAL | Age: 29
LOS: 2 days | Discharge: HOME | DRG: 189 | End: 2024-01-03
Attending: EMERGENCY MEDICINE | Admitting: STUDENT IN AN ORGANIZED HEALTH CARE EDUCATION/TRAINING PROGRAM
Payer: COMMERCIAL

## 2024-01-01 DIAGNOSIS — J45.901: ICD-10-CM

## 2024-01-01 DIAGNOSIS — J45.901 ASTHMA EXACERBATION: Primary | ICD-10-CM

## 2024-01-01 PROBLEM — S22.31XA FRACTURE OF ONE RIB OF RIGHT SIDE: Status: ACTIVE | Noted: 2024-01-01

## 2024-01-01 PROBLEM — F32.A ANXIETY AND DEPRESSION: Status: ACTIVE | Noted: 2024-01-01

## 2024-01-01 PROBLEM — F41.9 ANXIETY AND DEPRESSION: Status: ACTIVE | Noted: 2024-01-01

## 2024-01-01 PROBLEM — J96.91 RESPIRATORY FAILURE WITH HYPOXIA AND HYPERCAPNIA (HCC): Status: ACTIVE | Noted: 2024-01-01

## 2024-01-01 PROBLEM — J96.92 RESPIRATORY FAILURE WITH HYPOXIA AND HYPERCAPNIA (HCC): Status: ACTIVE | Noted: 2024-01-01

## 2024-01-01 PROBLEM — R65.20 SEVERE SEPSIS (HCC): Status: ACTIVE | Noted: 2024-01-01

## 2024-01-01 PROBLEM — E87.20 LACTIC ACIDOSIS: Status: ACTIVE | Noted: 2024-01-01

## 2024-01-01 PROBLEM — A41.9 SEVERE SEPSIS (HCC): Status: ACTIVE | Noted: 2024-01-01

## 2024-01-01 LAB
ALBUMIN SERPL BCP-MCNC: 4.5 G/DL (ref 3.5–5)
ALP SERPL-CCNC: 60 U/L (ref 34–104)
ALT SERPL W P-5'-P-CCNC: 21 U/L (ref 7–52)
ANION GAP SERPL CALCULATED.3IONS-SCNC: 10 MMOL/L
ANION GAP SERPL CALCULATED.3IONS-SCNC: 17 MMOL/L
APTT PPP: 28 SECONDS (ref 23–37)
ARTERIAL PATENCY WRIST A: YES
AST SERPL W P-5'-P-CCNC: 20 U/L (ref 13–39)
ATRIAL RATE: 130 BPM
BACTERIA UR QL AUTO: ABNORMAL /HPF
BASE EX.OXY STD BLDV CALC-SCNC: 77.2 % (ref 60–80)
BASE EXCESS BLDA CALC-SCNC: -12.2 MMOL/L
BASE EXCESS BLDV CALC-SCNC: -3.2 MMOL/L
BASOPHILS # BLD AUTO: 0.09 THOUSANDS/ÂΜL (ref 0–0.1)
BASOPHILS NFR BLD AUTO: 1 % (ref 0–1)
BILIRUB SERPL-MCNC: 0.32 MG/DL (ref 0.2–1)
BILIRUB UR QL STRIP: NEGATIVE
BUN SERPL-MCNC: 10 MG/DL (ref 5–25)
BUN SERPL-MCNC: 9 MG/DL (ref 5–25)
CALCIUM SERPL-MCNC: 8.2 MG/DL (ref 8.4–10.2)
CALCIUM SERPL-MCNC: 9.3 MG/DL (ref 8.4–10.2)
CHLORIDE SERPL-SCNC: 103 MMOL/L (ref 96–108)
CHLORIDE SERPL-SCNC: 106 MMOL/L (ref 96–108)
CLARITY UR: CLEAR
CO2 SERPL-SCNC: 14 MMOL/L (ref 21–32)
CO2 SERPL-SCNC: 26 MMOL/L (ref 21–32)
COLOR UR: COLORLESS
CREAT SERPL-MCNC: 0.7 MG/DL (ref 0.6–1.3)
CREAT SERPL-MCNC: 0.81 MG/DL (ref 0.6–1.3)
EOSINOPHIL # BLD AUTO: 0.45 THOUSAND/ÂΜL (ref 0–0.61)
EOSINOPHIL NFR BLD AUTO: 3 % (ref 0–6)
ERYTHROCYTE [DISTWIDTH] IN BLOOD BY AUTOMATED COUNT: 12.1 % (ref 11.6–15.1)
FLUAV RNA RESP QL NAA+PROBE: NEGATIVE
FLUBV RNA RESP QL NAA+PROBE: NEGATIVE
GFR SERPL CREATININE-BSD FRML MDRD: 116 ML/MIN/1.73SQ M
GFR SERPL CREATININE-BSD FRML MDRD: 97 ML/MIN/1.73SQ M
GLUCOSE SERPL-MCNC: 140 MG/DL (ref 65–140)
GLUCOSE SERPL-MCNC: 213 MG/DL (ref 65–140)
GLUCOSE UR STRIP-MCNC: ABNORMAL MG/DL
HCG SERPL QL: NEGATIVE
HCG SERPL QL: NEGATIVE
HCO3 BLDA-SCNC: 13.6 MMOL/L (ref 22–28)
HCO3 BLDV-SCNC: 24.2 MMOL/L (ref 24–30)
HCT VFR BLD AUTO: 41.6 % (ref 34.8–46.1)
HGB BLD-MCNC: 14 G/DL (ref 11.5–15.4)
HGB UR QL STRIP.AUTO: NEGATIVE
HYALINE CASTS #/AREA URNS LPF: ABNORMAL /LPF
IMM GRANULOCYTES # BLD AUTO: 0.07 THOUSAND/UL (ref 0–0.2)
IMM GRANULOCYTES NFR BLD AUTO: 0 % (ref 0–2)
INR PPP: 0.94 (ref 0.84–1.19)
IPAP: 10
KETONES UR STRIP-MCNC: ABNORMAL MG/DL
LACTATE SERPL-SCNC: 2.7 MMOL/L (ref 0.5–2)
LACTATE SERPL-SCNC: 5.3 MMOL/L (ref 0.5–2)
LACTATE SERPL-SCNC: 6.1 MMOL/L (ref 0.5–2)
LACTATE SERPL-SCNC: 9.3 MMOL/L (ref 0.5–2)
LACTATE SERPL-SCNC: 9.5 MMOL/L (ref 0.5–2)
LEUKOCYTE ESTERASE UR QL STRIP: ABNORMAL
LYMPHOCYTES # BLD AUTO: 1.72 THOUSANDS/ÂΜL (ref 0.6–4.47)
LYMPHOCYTES NFR BLD AUTO: 10 % (ref 14–44)
MAGNESIUM SERPL-MCNC: 2.6 MG/DL (ref 1.9–2.7)
MCH RBC QN AUTO: 31.3 PG (ref 26.8–34.3)
MCHC RBC AUTO-ENTMCNC: 33.7 G/DL (ref 31.4–37.4)
MCV RBC AUTO: 93 FL (ref 82–98)
MONOCYTES # BLD AUTO: 0.73 THOUSAND/ÂΜL (ref 0.17–1.22)
MONOCYTES NFR BLD AUTO: 4 % (ref 4–12)
NEUTROPHILS # BLD AUTO: 13.44 THOUSANDS/ÂΜL (ref 1.85–7.62)
NEUTS SEG NFR BLD AUTO: 82 % (ref 43–75)
NITRITE UR QL STRIP: NEGATIVE
NON VENT- BIPAP: ABNORMAL
NON-SQ EPI CELLS URNS QL MICRO: ABNORMAL /HPF
NRBC BLD AUTO-RTO: 0 /100 WBCS
O2 CT BLDA-SCNC: 19.1 ML/DL (ref 16–23)
O2 CT BLDV-SCNC: 16.1 ML/DL
OXYHGB MFR BLDA: 97.2 % (ref 94–97)
P AXIS: 76 DEGREES
PCO2 BLDA: 31.2 MM HG (ref 36–44)
PCO2 BLDV: 52.7 MM HG (ref 42–50)
PEEP MAX SETTING VENT: 5 CM[H2O]
PH BLDA: 7.26 [PH] (ref 7.35–7.45)
PH BLDV: 7.28 [PH] (ref 7.3–7.4)
PH UR STRIP.AUTO: 5 [PH]
PLATELET # BLD AUTO: 320 THOUSANDS/UL (ref 149–390)
PMV BLD AUTO: 10.4 FL (ref 8.9–12.7)
PO2 BLDA: 133.7 MM HG (ref 75–129)
PO2 BLDV: 46.2 MM HG (ref 35–45)
POTASSIUM SERPL-SCNC: 3.8 MMOL/L (ref 3.5–5.3)
POTASSIUM SERPL-SCNC: 3.9 MMOL/L (ref 3.5–5.3)
PR INTERVAL: 142 MS
PROCALCITONIN SERPL-MCNC: <0.05 NG/ML
PROT SERPL-MCNC: 7.9 G/DL (ref 6.4–8.4)
PROT UR STRIP-MCNC: ABNORMAL MG/DL
PROTHROMBIN TIME: 13.2 SECONDS (ref 11.6–14.5)
QRS AXIS: 94 DEGREES
QRSD INTERVAL: 68 MS
QT INTERVAL: 302 MS
QTC INTERVAL: 444 MS
RBC # BLD AUTO: 4.48 MILLION/UL (ref 3.81–5.12)
RBC #/AREA URNS AUTO: ABNORMAL /HPF
RSV RNA RESP QL NAA+PROBE: NEGATIVE
SARS-COV-2 RNA RESP QL NAA+PROBE: NEGATIVE
SODIUM SERPL-SCNC: 137 MMOL/L (ref 135–147)
SODIUM SERPL-SCNC: 139 MMOL/L (ref 135–147)
SP GR UR STRIP.AUTO: 1.03 (ref 1–1.03)
SPECIMEN SOURCE: ABNORMAL
T WAVE AXIS: 59 DEGREES
UROBILINOGEN UR STRIP-ACNC: <2 MG/DL
VENT BIPAP FIO2: 40 %
VENTRICULAR RATE: 130 BPM
WBC # BLD AUTO: 16.5 THOUSAND/UL (ref 4.31–10.16)
WBC #/AREA URNS AUTO: ABNORMAL /HPF

## 2024-01-01 PROCEDURE — 71045 X-RAY EXAM CHEST 1 VIEW: CPT

## 2024-01-01 PROCEDURE — 96376 TX/PRO/DX INJ SAME DRUG ADON: CPT

## 2024-01-01 PROCEDURE — 82805 BLOOD GASES W/O2 SATURATION: CPT | Performed by: EMERGENCY MEDICINE

## 2024-01-01 PROCEDURE — 87040 BLOOD CULTURE FOR BACTERIA: CPT | Performed by: EMERGENCY MEDICINE

## 2024-01-01 PROCEDURE — 83605 ASSAY OF LACTIC ACID: CPT | Performed by: NURSE PRACTITIONER

## 2024-01-01 PROCEDURE — 94640 AIRWAY INHALATION TREATMENT: CPT

## 2024-01-01 PROCEDURE — 94002 VENT MGMT INPAT INIT DAY: CPT

## 2024-01-01 PROCEDURE — 85610 PROTHROMBIN TIME: CPT | Performed by: EMERGENCY MEDICINE

## 2024-01-01 PROCEDURE — 81001 URINALYSIS AUTO W/SCOPE: CPT | Performed by: NURSE PRACTITIONER

## 2024-01-01 PROCEDURE — 96367 TX/PROPH/DG ADDL SEQ IV INF: CPT

## 2024-01-01 PROCEDURE — 84703 CHORIONIC GONADOTROPIN ASSAY: CPT | Performed by: NURSE PRACTITIONER

## 2024-01-01 PROCEDURE — 71275 CT ANGIOGRAPHY CHEST: CPT

## 2024-01-01 PROCEDURE — 0202U NFCT DS 22 TRGT SARS-COV-2: CPT | Performed by: NURSE PRACTITIONER

## 2024-01-01 PROCEDURE — 99291 CRITICAL CARE FIRST HOUR: CPT | Performed by: STUDENT IN AN ORGANIZED HEALTH CARE EDUCATION/TRAINING PROGRAM

## 2024-01-01 PROCEDURE — 96366 THER/PROPH/DIAG IV INF ADDON: CPT

## 2024-01-01 PROCEDURE — 82805 BLOOD GASES W/O2 SATURATION: CPT | Performed by: NURSE PRACTITIONER

## 2024-01-01 PROCEDURE — 84145 PROCALCITONIN (PCT): CPT | Performed by: EMERGENCY MEDICINE

## 2024-01-01 PROCEDURE — 94644 CONT INHLJ TX 1ST HOUR: CPT

## 2024-01-01 PROCEDURE — 85730 THROMBOPLASTIN TIME PARTIAL: CPT | Performed by: EMERGENCY MEDICINE

## 2024-01-01 PROCEDURE — 93005 ELECTROCARDIOGRAM TRACING: CPT

## 2024-01-01 PROCEDURE — G1004 CDSM NDSC: HCPCS

## 2024-01-01 PROCEDURE — 96365 THER/PROPH/DIAG IV INF INIT: CPT

## 2024-01-01 PROCEDURE — 99291 CRITICAL CARE FIRST HOUR: CPT | Performed by: EMERGENCY MEDICINE

## 2024-01-01 PROCEDURE — 94760 N-INVAS EAR/PLS OXIMETRY 1: CPT

## 2024-01-01 PROCEDURE — 87081 CULTURE SCREEN ONLY: CPT | Performed by: NURSE PRACTITIONER

## 2024-01-01 PROCEDURE — 80048 BASIC METABOLIC PNL TOTAL CA: CPT | Performed by: STUDENT IN AN ORGANIZED HEALTH CARE EDUCATION/TRAINING PROGRAM

## 2024-01-01 PROCEDURE — 80053 COMPREHEN METABOLIC PANEL: CPT | Performed by: EMERGENCY MEDICINE

## 2024-01-01 PROCEDURE — 0241U HB NFCT DS VIR RESP RNA 4 TRGT: CPT | Performed by: EMERGENCY MEDICINE

## 2024-01-01 PROCEDURE — 96375 TX/PRO/DX INJ NEW DRUG ADDON: CPT

## 2024-01-01 PROCEDURE — 99285 EMERGENCY DEPT VISIT HI MDM: CPT

## 2024-01-01 PROCEDURE — 84703 CHORIONIC GONADOTROPIN ASSAY: CPT | Performed by: EMERGENCY MEDICINE

## 2024-01-01 PROCEDURE — 83605 ASSAY OF LACTIC ACID: CPT | Performed by: EMERGENCY MEDICINE

## 2024-01-01 PROCEDURE — 36415 COLL VENOUS BLD VENIPUNCTURE: CPT | Performed by: EMERGENCY MEDICINE

## 2024-01-01 PROCEDURE — 85025 COMPLETE CBC W/AUTO DIFF WBC: CPT | Performed by: EMERGENCY MEDICINE

## 2024-01-01 PROCEDURE — 83735 ASSAY OF MAGNESIUM: CPT | Performed by: STUDENT IN AN ORGANIZED HEALTH CARE EDUCATION/TRAINING PROGRAM

## 2024-01-01 RX ORDER — METHYLPREDNISOLONE SODIUM SUCCINATE 125 MG/2ML
125 INJECTION, POWDER, LYOPHILIZED, FOR SOLUTION INTRAMUSCULAR; INTRAVENOUS ONCE
Status: COMPLETED | OUTPATIENT
Start: 2024-01-01 | End: 2024-01-01

## 2024-01-01 RX ORDER — HEPARIN SODIUM 5000 [USP'U]/ML
7500 INJECTION, SOLUTION INTRAVENOUS; SUBCUTANEOUS EVERY 8 HOURS SCHEDULED
Status: DISCONTINUED | OUTPATIENT
Start: 2024-01-01 | End: 2024-01-03 | Stop reason: HOSPADM

## 2024-01-01 RX ORDER — METHYLPREDNISOLONE SODIUM SUCCINATE 125 MG/2ML
60 INJECTION, POWDER, LYOPHILIZED, FOR SOLUTION INTRAMUSCULAR; INTRAVENOUS EVERY 8 HOURS SCHEDULED
Status: DISCONTINUED | OUTPATIENT
Start: 2024-01-01 | End: 2024-01-02

## 2024-01-01 RX ORDER — PRAMIPEXOLE DIHYDROCHLORIDE 0.5 MG/1
0.25 TABLET ORAL ONCE
Status: COMPLETED | OUTPATIENT
Start: 2024-01-01 | End: 2024-01-01

## 2024-01-01 RX ORDER — MAGNESIUM SULFATE HEPTAHYDRATE 40 MG/ML
2 INJECTION, SOLUTION INTRAVENOUS ONCE
Status: COMPLETED | OUTPATIENT
Start: 2024-01-01 | End: 2024-01-01

## 2024-01-01 RX ORDER — LEVALBUTEROL INHALATION SOLUTION 1.25 MG/3ML
1.25 SOLUTION RESPIRATORY (INHALATION)
Status: DISCONTINUED | OUTPATIENT
Start: 2024-01-01 | End: 2024-01-01

## 2024-01-01 RX ORDER — ALBUTEROL SULFATE 2.5 MG/3ML
5 SOLUTION RESPIRATORY (INHALATION) ONCE
Status: DISCONTINUED | OUTPATIENT
Start: 2024-01-01 | End: 2024-01-02

## 2024-01-01 RX ORDER — CHLORHEXIDINE GLUCONATE ORAL RINSE 1.2 MG/ML
15 SOLUTION DENTAL EVERY 12 HOURS SCHEDULED
Status: DISCONTINUED | OUTPATIENT
Start: 2024-01-01 | End: 2024-01-03 | Stop reason: HOSPADM

## 2024-01-01 RX ORDER — SODIUM CHLORIDE, SODIUM GLUCONATE, SODIUM ACETATE, POTASSIUM CHLORIDE, MAGNESIUM CHLORIDE, SODIUM PHOSPHATE, DIBASIC, AND POTASSIUM PHOSPHATE .53; .5; .37; .037; .03; .012; .00082 G/100ML; G/100ML; G/100ML; G/100ML; G/100ML; G/100ML; G/100ML
125 INJECTION, SOLUTION INTRAVENOUS CONTINUOUS
Status: DISCONTINUED | OUTPATIENT
Start: 2024-01-01 | End: 2024-01-02

## 2024-01-01 RX ORDER — LEVOFLOXACIN 5 MG/ML
750 INJECTION, SOLUTION INTRAVENOUS ONCE
Status: COMPLETED | OUTPATIENT
Start: 2024-01-01 | End: 2024-01-01

## 2024-01-01 RX ORDER — BUPROPION HYDROCHLORIDE 150 MG/1
150 TABLET ORAL DAILY
COMMUNITY

## 2024-01-01 RX ORDER — SODIUM CHLORIDE, SODIUM GLUCONATE, SODIUM ACETATE, POTASSIUM CHLORIDE, MAGNESIUM CHLORIDE, SODIUM PHOSPHATE, DIBASIC, AND POTASSIUM PHOSPHATE .53; .5; .37; .037; .03; .012; .00082 G/100ML; G/100ML; G/100ML; G/100ML; G/100ML; G/100ML; G/100ML
1000 INJECTION, SOLUTION INTRAVENOUS ONCE
Status: COMPLETED | OUTPATIENT
Start: 2024-01-01 | End: 2024-01-01

## 2024-01-01 RX ORDER — SODIUM CHLORIDE FOR INHALATION 0.9 %
12 VIAL, NEBULIZER (ML) INHALATION ONCE
Status: COMPLETED | OUTPATIENT
Start: 2024-01-01 | End: 2024-01-01

## 2024-01-01 RX ORDER — LIDOCAINE 50 MG/G
1 PATCH TOPICAL DAILY
Status: DISCONTINUED | OUTPATIENT
Start: 2024-01-01 | End: 2024-01-03 | Stop reason: HOSPADM

## 2024-01-01 RX ORDER — ALBUTEROL SULFATE 2.5 MG/3ML
5 SOLUTION RESPIRATORY (INHALATION)
Status: DISCONTINUED | OUTPATIENT
Start: 2024-01-01 | End: 2024-01-01

## 2024-01-01 RX ORDER — LORAZEPAM 2 MG/ML
0.5 INJECTION INTRAMUSCULAR ONCE
Status: COMPLETED | OUTPATIENT
Start: 2024-01-01 | End: 2024-01-01

## 2024-01-01 RX ORDER — ALBUTEROL SULFATE 2.5 MG/3ML
2.5 SOLUTION RESPIRATORY (INHALATION) EVERY 4 HOURS PRN
Status: DISCONTINUED | OUTPATIENT
Start: 2024-01-01 | End: 2024-01-03 | Stop reason: HOSPADM

## 2024-01-01 RX ORDER — TERBUTALINE SULFATE 1 MG/ML
0.25 INJECTION, SOLUTION SUBCUTANEOUS ONCE
Status: COMPLETED | OUTPATIENT
Start: 2024-01-01 | End: 2024-01-01

## 2024-01-01 RX ORDER — HEPARIN SODIUM 5000 [USP'U]/ML
5000 INJECTION, SOLUTION INTRAVENOUS; SUBCUTANEOUS EVERY 8 HOURS SCHEDULED
Status: DISCONTINUED | OUTPATIENT
Start: 2024-01-01 | End: 2024-01-01

## 2024-01-01 RX ORDER — ALBUTEROL SULFATE 2.5 MG/3ML
5 SOLUTION RESPIRATORY (INHALATION) ONCE
Status: COMPLETED | OUTPATIENT
Start: 2024-01-01 | End: 2024-01-01

## 2024-01-01 RX ORDER — DEXMEDETOMIDINE HYDROCHLORIDE 4 UG/ML
.1-.7 INJECTION, SOLUTION INTRAVENOUS
Status: DISCONTINUED | OUTPATIENT
Start: 2024-01-01 | End: 2024-01-01

## 2024-01-01 RX ORDER — LORAZEPAM 2 MG/ML
1 INJECTION INTRAMUSCULAR ONCE
Status: COMPLETED | OUTPATIENT
Start: 2024-01-01 | End: 2024-01-01

## 2024-01-01 RX ORDER — LEVALBUTEROL INHALATION SOLUTION 1.25 MG/3ML
1.25 SOLUTION RESPIRATORY (INHALATION)
Status: DISCONTINUED | OUTPATIENT
Start: 2024-01-01 | End: 2024-01-02

## 2024-01-01 RX ORDER — ALBUTEROL SULFATE 2.5 MG/3ML
2.5 SOLUTION RESPIRATORY (INHALATION)
Status: DISCONTINUED | OUTPATIENT
Start: 2024-01-01 | End: 2024-01-01

## 2024-01-01 RX ORDER — IPRATROPIUM BROMIDE AND ALBUTEROL SULFATE 2.5; .5 MG/3ML; MG/3ML
SOLUTION RESPIRATORY (INHALATION)
Status: COMPLETED
Start: 2024-01-01 | End: 2024-01-01

## 2024-01-01 RX ADMIN — SODIUM CHLORIDE, SODIUM GLUCONATE, SODIUM ACETATE, POTASSIUM CHLORIDE, MAGNESIUM CHLORIDE, SODIUM PHOSPHATE, DIBASIC, AND POTASSIUM PHOSPHATE 50 ML/HR: .53; .5; .37; .037; .03; .012; .00082 INJECTION, SOLUTION INTRAVENOUS at 13:50

## 2024-01-01 RX ADMIN — PRAMIPEXOLE DIHYDROCHLORIDE 0.25 MG: 0.5 TABLET ORAL at 22:36

## 2024-01-01 RX ADMIN — LEVOFLOXACIN 750 MG: 750 INJECTION, SOLUTION INTRAVENOUS at 11:11

## 2024-01-01 RX ADMIN — SODIUM CHLORIDE 1000 ML: 0.9 INJECTION, SOLUTION INTRAVENOUS at 22:37

## 2024-01-01 RX ADMIN — LEVALBUTEROL HYDROCHLORIDE 1.25 MG: 1.25 SOLUTION RESPIRATORY (INHALATION) at 20:55

## 2024-01-01 RX ADMIN — SODIUM CHLORIDE 1000 ML: 0.9 INJECTION, SOLUTION INTRAVENOUS at 11:09

## 2024-01-01 RX ADMIN — CHLORHEXIDINE GLUCONATE 0.12% ORAL RINSE 15 ML: 1.2 LIQUID ORAL at 13:58

## 2024-01-01 RX ADMIN — LORAZEPAM 1 MG: 2 INJECTION INTRAMUSCULAR; INTRAVENOUS at 12:33

## 2024-01-01 RX ADMIN — IOHEXOL 85 ML: 350 INJECTION, SOLUTION INTRAVENOUS at 11:40

## 2024-01-01 RX ADMIN — DEXMEDETOMIDINE HYDROCHLORIDE 0.2 MCG/KG/HR: 400 INJECTION INTRAVENOUS at 14:31

## 2024-01-01 RX ADMIN — LORAZEPAM 0.5 MG: 2 INJECTION INTRAMUSCULAR; INTRAVENOUS at 10:15

## 2024-01-01 RX ADMIN — ALBUTEROL SULFATE 10 MG: 2.5 SOLUTION RESPIRATORY (INHALATION) at 09:53

## 2024-01-01 RX ADMIN — IPRATROPIUM BROMIDE 0.5 MG: 0.5 SOLUTION RESPIRATORY (INHALATION) at 12:25

## 2024-01-01 RX ADMIN — METHYLPREDNISOLONE SODIUM SUCCINATE 60 MG: 125 INJECTION, POWDER, FOR SOLUTION INTRAMUSCULAR; INTRAVENOUS at 13:49

## 2024-01-01 RX ADMIN — SODIUM CHLORIDE, SODIUM GLUCONATE, SODIUM ACETATE, POTASSIUM CHLORIDE, MAGNESIUM CHLORIDE, SODIUM PHOSPHATE, DIBASIC, AND POTASSIUM PHOSPHATE 1000 ML: .53; .5; .37; .037; .03; .012; .00082 INJECTION, SOLUTION INTRAVENOUS at 16:18

## 2024-01-01 RX ADMIN — METHYLPREDNISOLONE SODIUM SUCCINATE 125 MG: 125 INJECTION, POWDER, FOR SOLUTION INTRAMUSCULAR; INTRAVENOUS at 09:49

## 2024-01-01 RX ADMIN — ALBUTEROL SULFATE 5 MG: 2.5 SOLUTION RESPIRATORY (INHALATION) at 12:25

## 2024-01-01 RX ADMIN — IPRATROPIUM BROMIDE 1 MG: 0.5 SOLUTION RESPIRATORY (INHALATION) at 09:53

## 2024-01-01 RX ADMIN — AZITHROMYCIN MONOHYDRATE 500 MG: 500 INJECTION, POWDER, LYOPHILIZED, FOR SOLUTION INTRAVENOUS at 15:32

## 2024-01-01 RX ADMIN — MAGNESIUM SULFATE HEPTAHYDRATE 2 G: 40 INJECTION, SOLUTION INTRAVENOUS at 13:56

## 2024-01-01 RX ADMIN — IPRATROPIUM BROMIDE AND ALBUTEROL SULFATE: .5; 3 SOLUTION RESPIRATORY (INHALATION) at 10:16

## 2024-01-01 RX ADMIN — MAGNESIUM SULFATE HEPTAHYDRATE 2 G: 40 INJECTION, SOLUTION INTRAVENOUS at 09:49

## 2024-01-01 RX ADMIN — Medication 12 ML: at 09:53

## 2024-01-01 RX ADMIN — TERBUTALINE SULFATE 0.25 MG: 1 INJECTION SUBCUTANEOUS at 14:03

## 2024-01-01 RX ADMIN — ALBUTEROL SULFATE 5 MG: 2.5 SOLUTION RESPIRATORY (INHALATION) at 15:49

## 2024-01-01 RX ADMIN — HEPARIN SODIUM 7500 UNITS: 5000 INJECTION INTRAVENOUS; SUBCUTANEOUS at 14:09

## 2024-01-01 RX ADMIN — SODIUM CHLORIDE, SODIUM GLUCONATE, SODIUM ACETATE, POTASSIUM CHLORIDE, MAGNESIUM CHLORIDE, SODIUM PHOSPHATE, DIBASIC, AND POTASSIUM PHOSPHATE 1000 ML: .53; .5; .37; .037; .03; .012; .00082 INJECTION, SOLUTION INTRAVENOUS at 19:53

## 2024-01-01 RX ADMIN — METHYLPREDNISOLONE SODIUM SUCCINATE 60 MG: 125 INJECTION, POWDER, FOR SOLUTION INTRAMUSCULAR; INTRAVENOUS at 21:32

## 2024-01-01 RX ADMIN — IPRATROPIUM BROMIDE 0.5 MG: 0.5 SOLUTION RESPIRATORY (INHALATION) at 20:55

## 2024-01-01 RX ADMIN — HEPARIN SODIUM 7500 UNITS: 5000 INJECTION INTRAVENOUS; SUBCUTANEOUS at 21:41

## 2024-01-01 RX ADMIN — EPINEPHRINE 2 MCG/MIN: 1 INJECTION INTRAMUSCULAR; INTRAVENOUS; SUBCUTANEOUS at 14:05

## 2024-01-01 NOTE — H&P
[facility]  H&P  Name: Domenica De Los Santos 28 y.o. female I MRN: [id number]  Unit/Bed#:  I Date of Admission: 1/1/2024   Date of Service: 1/1/2024 I Hospital Day: 0      Assessment/Plan   * Respiratory failure with hypoxia and hypercapnia (HCC)  Assessment & Plan  Secondary to acute asthma exacerbation   Patient recently has pneumonia in October and has had persistent cough since original diagnosis  Reports history of using rescue inhaler during sports activities as a child and teenager.  However, has not used her rescue inhaler since college  Patient also states that she has an allergy to cats and has been in the presence of cats the last two days  Etiology viral infectious vs residual pneumonia vs hypersensitivity reaction to cat dander   CT imaging negative for PE.  There does not appear to be active infiltrate noted on CT scan   Continues to have a leukocytosis.  This could represent stress response in the setting of asthma exacerbation. However, cannot completely rule out viral infectious process  COVID/RSV/influenza negative   Will check RP2 panel   Check MRSA   Will initiate BIPAP to support respiratory effort   Repeat ABG at 1500  Received Levaquin in the ED will continue with Azithromycin for abx coverage  Q2 hour albuterol for asthma exacerbation along with terbutaline x 1, and initiation of epinephrine gtt      Severe asthma with exacerbation  Assessment & Plan  Patient has history of inhaler use as a child and teenager for exercise induced asthma.  However, she has not required its use for several years   Has never been intubated   Etiology viral infection vs persistent pneumonia vs. Hypersensitivity reaction to cat dander   Diffuse wheezing and increased work of breathing on presentation   Slight improvement with albuterol and solumedrol given in the ED.  However, continues to have increased work of breathing   Will initiate BIPAP for respiratory support  Terbutaline x 1 and  IV mag   Albuterol nebs 5 mg q 2 hours   Will continue solumedrol 60 mg IV q 8 hours   Will initiate epinephrine gtt at 2 mcg/min   Will repeat ABG     Severe sepsis (HCC)  Assessment & Plan  As evidenced by tachycardia, tachypnea, leukocytosis and lactate > 2.0  Presenting lactate 2.7, will trend until reaches endpoint   This may all represent SIRS from her asthma exacerbation in addition to a type B lactic acidosis secondary to albuterol administration.  However, cannot rule out viral infection   Received 1 L NS in the emergency department, will continue with maintenance IVF   COVID/RSV/influenza PCR negative   Will check RP2 and MRSA  Received levaquin in the ED, will continue with azithromycin   Will trend procalcitonin, WBCS and temperature curve     Lactic acidosis  Assessment & Plan  Likely type B lactic acidosis in the setting of frequent albuterol   However cannot rule out infectious etiology   Received 1 L NS in the ED, will continue with maintenance IVF  Trend until reaches end point     Anxiety and depression  Assessment & Plan  Hold home dose wellbutrin until off BIPAP            History of Present Illness     HPI: Domenica De Los Santos is a 28 y.o. who presents with a past medical history of anxiety and depression on wellbutrin.  She presents to the emergency department for evaluation for worsening shortness of breath.  She describes having pneumonia in October.  After recovering from the infection, she had a persistent cough for which she was seen again in the emergency department for pleuritic chest pain.  The cough has continued to worsen.  She does endorse and allergy to cat dander and has been around cats the last 2 days.  However, she typically only has a reaction when she touches a cat.  She began utilizing a rescue inhaler without improvement which prompted her evaluation.  In the emergency department, she was noted to be in extremis with diffuse wheezing.  She denies smoking, inhaling illicit  substances or regular alcohol use.  She is referred to the icu for further management and care.      History obtained from the patient and patient's boyfriend .  Review of Systems  Disposition: Critical care  Historical Information   Past Medical History:  No date: Asthma No past surgical history on file.   No current outpatient medications Allergies   Allergen Reactions    Penicillins Anaphylaxis      Social History     Tobacco Use    Smoking status: Never    Smokeless tobacco: Never    History reviewed. No pertinent family history.       Objective                            Vitals I/O      Most Recent Min/Max in 24hrs   Temp 98.3 °F (36.8 °C) Temp  Min: 98 °F (36.7 °C)  Max: 98.5 °F (36.9 °C)   Pulse (!) 132 Pulse  Min: 126  Max: 155   Resp (!) 29 Resp  Min: 24  Max: 35   /65 BP  Min: 125/67  Max: 182/87   O2 Sat 98 % SpO2  Min: 87 %  Max: 99 %      Intake/Output Summary (Last 24 hours) at 1/1/2024 1522  Last data filed at 1/1/2024 1356  Gross per 24 hour   Intake 1200 ml   Output --   Net 1200 ml       Diet NPO    Invasive Monitoring           Physical Exam   Physical Exam  Eyes:      General: Lids are normal.      Extraocular Movements: Extraocular movements intact.      Conjunctiva/sclera: Conjunctivae normal.   Skin:     General: Skin is warm and dry.   HENT:      Head: Normocephalic and atraumatic.   Neck:      Trachea: Trachea normal.   Cardiovascular:      Rate and Rhythm: Tachycardia present.      Pulses: Normal pulses.   Musculoskeletal:      Cervical back: Normal range of motion.      Right lower leg: No edema.      Left lower leg: No edema.   Abdominal:      Palpations: Abdomen is soft.      Tenderness: There is no abdominal tenderness.   Constitutional:       General: She is awake. She is in acute distress.      Appearance: She is obese.      Interventions: Face mask in place.   Pulmonary:      Effort: Tachypnea, accessory muscle usage, respiratory distress and accessory muscle usage present.       Breath sounds: Wheezing present.   Psychiatric:         Mood and Affect: Mood is anxious. Affect is tearful.   Neurological:      General: No focal deficit present.      Mental Status: She is alert.      GCS: GCS eye subscore is 4. GCS verbal subscore is 5. GCS motor subscore is 6.      Sensory: Sensation is intact.            Diagnostic Studies      EKG: ST   Imaging:   CTA ED chest PE Study   Final Result      No PE or acute pulmonary pathology.      Right T12 rib fracture. Indeterminate age. Correlate clinically for recent trauma and point tenderness.                  Workstation performed: [id number]         XR chest 1 view portable   Final Result   No acute cardiopulmonary findings.                  Workstation performed: [id number]               Medications:  Scheduled PRN   albuterol, 5 mg, Once  albuterol, 5 mg, Q2H  azithromycin, 500 mg, Q24H  chlorhexidine, 15 mL, Q12H JODY  heparin (porcine), 7,500 Units, Q8H JODY  ipratropium, 0.5 mg, Once  magnesium sulfate, 2 g, Once  methylPREDNISolone sodium succinate, 60 mg, Q8H JODY          Continuous    dexmedetomidine, 0.1-0.7 mcg/kg/hr, Last Rate: 0.2 mcg/kg/hr (01/01/24 1431)  epinephrine, 2 mcg/min, Last Rate: 2 mcg/min (01/01/24 1405)  multi-electrolyte, 50 mL/hr, Last Rate: 50 mL/hr (01/01/24 1350)         Labs:    CBC    Recent Labs     01/01/24  0953   WBC 16.50*   HGB 14.0   HCT 41.6        BMP    Recent Labs     01/01/24  0953   SODIUM 139   K 3.9      CO2 26   AGAP 10   BUN 10   CREATININE 0.70   CALCIUM 9.3       Coags    Recent Labs     01/01/24  0953   INR 0.94   PTT 28        Additional Electrolytes  No recent results       Blood Gas    No recent results  Recent Labs     01/01/24  0953   PHVEN 7.280*   [id number] 52.7*   PO2VEN 46.2*   [id number] 24.2   BEVEN -3.2   [id number] 77.2    LFTs  Recent Labs     01/01/24  0953   ALT 21   AST 20   ALKPHOS 60   ALB 4.5   TBILI 0.32       Infectious  Recent Labs     01/01/24  0953   PROCALCITONI <0.05      Glucose  Recent Labs     01/01/24  0953   GLUC 140             Critical Care Time Delivered : Upon my evaluation, this patient had a high probability of imminent or life-threatening deterioration due to severe asthma exacerbation, which required my direct attention, intervention, and personal management.  I have personally provided 40 minutes of critical care time, exclusive of procedures, teaching, family meetings, and any prior time recorded by providers other than myself.   Anticipated Length of Stay is > 2 midnights  GHASSAN Eldridge

## 2024-01-01 NOTE — ASSESSMENT & PLAN NOTE
Secondary to acute asthma exacerbation   Patient recently has pneumonia in October and has had persistent cough since original diagnosis  Reports history of using rescue inhaler during sports activities as a child and teenager.  However, has not used her rescue inhaler since college  Patient also states that she has an allergy to cats and has been in the presence of cats the last two days  Etiology viral infectious vs residual pneumonia vs hypersensitivity reaction to cat dander   CT imaging negative for PE.  There does not appear to be active infiltrate noted on CT scan   Continues to have a leukocytosis.  This could represent stress response in the setting of asthma exacerbation. However, cannot completely rule out viral infectious process  COVID/RSV/influenza negative   Will check RP2 panel   Check MRSA   Will initiate BIPAP to support respiratory effort   Repeat ABG at 1500  Received Levaquin in the ED will continue with Azithromycin for abx coverage  Q2 hour albuterol for asthma exacerbation along with terbutaline x 1, and initiation of epinephrine gtt

## 2024-01-01 NOTE — ASSESSMENT & PLAN NOTE
As evidenced by tachycardia, tachypnea, leukocytosis and lactate > 2.0  Presenting lactate 2.7, will trend until reaches endpoint   This may all represent SIRS from her asthma exacerbation in addition to a type B lactic acidosis secondary to albuterol administration.  However, cannot rule out viral infection   Received 1 L NS in the emergency department, will continue with maintenance IVF   COVID/RSV/influenza PCR negative   Will check RP2 and MRSA  Received levaquin in the ED, will continue with azithromycin   Will trend procalcitonin, WBCS and temperature curve

## 2024-01-01 NOTE — ED PROVIDER NOTES
History  Chief Complaint   Patient presents with    Shortness of Breath     Pt c/o sob x 2 days     29 y/o female presents to the ED for sob and cough. Patient states that she has a hx of pneumonia in October and has a lingering cough since. She states that over the last 2 days she has had worsening cough and last night became acutely sob. She denies any fever, cp, abd pain, n/v, d/c, or urinary symptoms. She denies any other complaints. No hx of similar in the past but does have a rescue inhaler she was given prior for the pneumonia which has not helped.       History provided by:  Patient  History limited by:  Severe respiratory distress  Shortness of Breath  Severity:  Moderate  Onset quality:  Sudden  Timing:  Constant  Progression:  Worsening  Chronicity:  New  Relieved by:  None tried  Worsened by:  Nothing  Ineffective treatments:  None tried  Associated symptoms: cough    Associated symptoms: no abdominal pain, no fever and no vomiting        Prior to Admission Medications   Prescriptions Last Dose Informant Patient Reported? Taking?   buPROPion (WELLBUTRIN XL) 150 mg 24 hr tablet 12/31/2023 Self Yes Yes   Sig: Take 150 mg by mouth daily      Facility-Administered Medications: None       Past Medical History:   Diagnosis Date    Asthma        History reviewed. No pertinent surgical history.    History reviewed. No pertinent family history.  I have reviewed and agree with the history as documented.    E-Cigarette/Vaping     E-Cigarette/Vaping Substances     Social History     Tobacco Use    Smoking status: Never    Smokeless tobacco: Never   Substance Use Topics    Alcohol use: Never    Drug use: Yes     Types: Marijuana     Comment: infrequent       Review of Systems   Unable to perform ROS: Severe respiratory distress   Constitutional:  Negative for fever.   Respiratory:  Positive for cough and shortness of breath.    Gastrointestinal:  Negative for abdominal pain and vomiting.       Physical Exam  Physical  Exam  Vitals and nursing note reviewed.   Constitutional:       General: She is in acute distress.      Appearance: She is well-developed. She is ill-appearing.   HENT:      Head: Normocephalic and atraumatic.   Eyes:      Pupils: Pupils are equal, round, and reactive to light.   Cardiovascular:      Rate and Rhythm: Normal rate and regular rhythm.   Pulmonary:      Effort: Tachypnea and respiratory distress present.      Breath sounds: Wheezing present.   Abdominal:      General: Bowel sounds are normal.      Palpations: Abdomen is soft.   Musculoskeletal:         General: Normal range of motion.      Cervical back: Normal range of motion and neck supple.   Skin:     General: Skin is warm and dry.   Neurological:      General: No focal deficit present.      Mental Status: She is alert and oriented to person, place, and time.      Comments: No focal deficits         Vital Signs  ED Triage Vitals   Temperature Pulse Respirations Blood Pressure SpO2   01/01/24 0931 01/01/24 0931 01/01/24 0931 01/01/24 1015 01/01/24 0931   98.5 °F (36.9 °C) (!) 137 (!) 26 144/84 (!) 87 %      Temp Source Heart Rate Source Patient Position - Orthostatic VS BP Location FiO2 (%)   01/01/24 0931 01/01/24 0931 01/01/24 0931 01/01/24 0931 --   Temporal Monitor Sitting Left arm       Pain Score       01/01/24 1400       No Pain           Vitals:    01/02/24 1517 01/02/24 2335 01/03/24 0742 01/03/24 0751   BP: 123/75 115/76 119/77    Pulse: (!) 118 (!) 114 102 104   Patient Position - Orthostatic VS:             Visual Acuity      ED Medications  Medications   ipratropium-albuterol (DUO-NEB) 0.5-2.5 mg/3 mL inhalation solution **ADS Override Pull** (  Given by Other 1/1/24 1016)   magnesium sulfate 2 g/50 mL IVPB (premix) 2 g (0 g Intravenous Stopped 1/1/24 1115)   methylPREDNISolone sodium succinate (Solu-MEDROL) injection 125 mg (125 mg Intravenous Given 1/1/24 0949)   albuterol inhalation solution 10 mg (10 mg Nebulization Given 1/1/24  0953)   ipratropium (ATROVENT) 0.02 % inhalation solution 1 mg (1 mg Nebulization Given 1/1/24 0953)   sodium chloride 0.9 % inhalation solution 12 mL (12 mL Nebulization Given 1/1/24 0953)   LORazepam (ATIVAN) injection 0.5 mg (0.5 mg Intravenous Given 1/1/24 1015)   sodium chloride 0.9 % bolus 1,000 mL (0 mL Intravenous Stopped 1/1/24 1312)   levofloxacin (LEVAQUIN) IVPB (premix in dextrose) 750 mg 150 mL (0 mg Intravenous Stopped 1/1/24 1602)   iohexol (OMNIPAQUE) 350 MG/ML injection (MULTI-DOSE) 85 mL (85 mL Intravenous Given 1/1/24 1140)   albuterol inhalation solution 5 mg (5 mg Nebulization Given 1/1/24 1225)   ipratropium (ATROVENT) 0.02 % inhalation solution 0.5 mg (0.5 mg Nebulization Given 1/1/24 1225)   LORazepam (ATIVAN) injection 1 mg (1 mg Intravenous Given 1/1/24 1233)   terbutaline (BRETHINE) injection 0.25 mg (0.25 mg Subcutaneous Given 1/1/24 1403)   magnesium sulfate 2 g/50 mL IVPB (premix) 2 g (0 g Intravenous Stopped 1/1/24 1602)   multi-electrolyte (ISOLYTE-S PH 7.4) bolus 1,000 mL (1,000 mL Intravenous New Bag 1/1/24 1618)   multi-electrolyte (ISOLYTE-S PH 7.4) bolus 1,000 mL (1,000 mL Intravenous New Bag 1/1/24 1953)   sodium chloride 0.9 % bolus 1,000 mL (1,000 mL Intravenous New Bag 1/1/24 2237)   pramipexole (MIRAPEX) tablet 0.25 mg (0.25 mg Oral Given 1/1/24 2236)   methylPREDNISolone sodium succinate (Solu-MEDROL) injection 40 mg (40 mg Intravenous Given 1/2/24 2133)   LORazepam (ATIVAN) tablet 0.5 mg (0.5 mg Oral Given 1/2/24 2258)       Diagnostic Studies  Results Reviewed       Procedure Component Value Units Date/Time    Blood culture #1 [[id number]] Collected: 01/01/24 1009    Lab Status: Preliminary result Specimen: Blood from Arm, Left Updated: 01/04/24 0101     Blood Culture No Growth at 48 hrs.    Blood culture #2 [[id number]] Collected: 01/01/24 0953    Lab Status: Preliminary result Specimen: Blood from Arm, Right Updated: 01/04/24 0101     Blood Culture No Growth at 48 hrs.     Hemoglobin A1C [[id number]] Collected: 01/02/24 0427    Lab Status: Final result Specimen: Blood from Arm, Right Updated: 01/02/24 0840     Hemoglobin A1C 5.5 %       mg/dl     CBC and differential [[id number]]  (Abnormal) Collected: 01/02/24 0427    Lab Status: Final result Specimen: Blood from Arm, Right Updated: 01/02/24 0533     WBC 15.79 Thousand/uL      RBC 3.51 Million/uL      Hemoglobin 10.9 g/dL      Hematocrit 32.2 %      MCV 92 fL      MCH 30.8 pg      MCHC 33.5 g/dL      RDW 13.0 %      MPV 10.6 fL      Platelets 227 Thousands/uL      nRBC 0 /100 WBCs      Neutrophils Relative 90 %      Immat GRANS % 0 %      Lymphocytes Relative 7 %      Monocytes Relative 3 %      Eosinophils Relative 0 %      Basophils Relative 0 %      Neutrophils Absolute 14.20 Thousands/µL      Immature Grans Absolute 0.07 Thousand/uL      Lymphocytes Absolute 1.09 Thousands/µL      Monocytes Absolute 0.41 Thousand/µL      Eosinophils Absolute 0.00 Thousand/µL      Basophils Absolute 0.02 Thousands/µL     Procalcitonin [[id number]]  (Normal) Collected: 01/02/24 0427    Lab Status: Final result Specimen: Blood from Arm, Right Updated: 01/02/24 0516     Procalcitonin 0.05 ng/ml     Comprehensive metabolic panel [[id number]]  (Abnormal) Collected: 01/02/24 0427    Lab Status: Final result Specimen: Blood from Arm, Right Updated: 01/02/24 0508     Sodium 137 mmol/L      Potassium 3.8 mmol/L      Chloride 110 mmol/L      CO2 20 mmol/L      ANION GAP 7 mmol/L      BUN 7 mg/dL      Creatinine 0.54 mg/dL      Glucose 156 mg/dL      Calcium 8.1 mg/dL      AST 16 U/L      ALT 15 U/L      Alkaline Phosphatase 45 U/L      Total Protein 6.2 g/dL      Albumin 3.8 g/dL      Total Bilirubin 0.35 mg/dL      eGFR 127 ml/min/1.73sq m     Narrative:      National Kidney Disease Foundation guidelines for Chronic Kidney Disease (CKD):     Stage 1 with normal or high GFR (GFR > 90 mL/min/1.73 square meters)    Stage 2 Mild CKD (GFR = 60-89 mL/min/1.73  square meters)    Stage 3A Moderate CKD (GFR = 45-59 mL/min/1.73 square meters)    Stage 3B Moderate CKD (GFR = 30-44 mL/min/1.73 square meters)    Stage 4 Severe CKD (GFR = 15-29 mL/min/1.73 square meters)    Stage 5 End Stage CKD (GFR <15 mL/min/1.73 square meters)  Note: GFR calculation is accurate only with a steady state creatinine    Magnesium [[id number]]  (Normal) Collected: 01/02/24 0427    Lab Status: Final result Specimen: Blood from Arm, Right Updated: 01/02/24 0508     Magnesium 2.3 mg/dL     Phosphorus [[id number]]  (Abnormal) Collected: 01/02/24 0427    Lab Status: Final result Specimen: Blood from Arm, Right Updated: 01/02/24 0508     Phosphorus 2.1 mg/dL     Lipid panel [[id number]]  (Abnormal) Collected: 01/02/24 0427    Lab Status: Final result Specimen: Blood from Arm, Right Updated: 01/02/24 0508     Cholesterol 202 mg/dL      Triglycerides 85 mg/dL      HDL, Direct 58 mg/dL      LDL Calculated 127 mg/dL      Non-HDL-Chol (CHOL-HDL) 144 mg/dl     Calcium, ionized [[id number]]  (Normal) Collected: 01/02/24 0427    Lab Status: Final result Specimen: Blood from Arm, Right Updated: 01/02/24 0443     Calcium, Ionized 1.12 mmol/L     hCG, serum, qualitative [[id number]]  (Normal) Collected: 01/01/24 0953    Lab Status: Final result Specimen: Blood from Arm, Right Updated: 01/01/24 2216     Preg, Serum Negative    UA w Reflex to Microscopic w Reflex to Culture [[id number]]  (Abnormal) Collected: 01/01/24 2045    Lab Status: Final result Specimen: Urine, Clean Catch Updated: 01/01/24 2104     Color, UA Colorless     Clarity, UA Clear     Specific Gravity, UA 1.026     pH, UA 5.0     Leukocytes, UA Elevated glucose may cause decreased leukocyte values. See urine microscopic for UWBC result     Nitrite, UA Negative     Protein, UA Trace mg/dl      Glucose, UA >=1000 (1%) mg/dl      Ketones, UA 40 (2+) mg/dl      Urobilinogen, UA <2.0 mg/dl      Bilirubin, UA Negative     Occult Blood, UA Negative    Lactic  acid, plasma (w/reflex if result > 2.0) [[id number]]  (Abnormal) Collected: 01/01/24 1557    Lab Status: Final result Specimen: Blood from Arm, Right Updated: 01/01/24 1653     LACTIC ACID 9.5 mmol/L     Narrative:      Result may be elevated if tourniquet was used during collection.    Lactic acid 2 Hours [[id number]]  (Abnormal) Collected: 01/01/24 1319    Lab Status: Final result Specimen: Blood from Arm, Right Updated: 01/01/24 1408     LACTIC ACID 6.1 mmol/L     Narrative:      Result may be elevated if tourniquet was used during collection.    FLU/RSV/COVID - if FLU/RSV clinically relevant [[id number]]  (Normal) Collected: 01/01/24 1010    Lab Status: Final result Specimen: Nares from Nose Updated: 01/01/24 1101     SARS-CoV-2 Negative     INFLUENZA A PCR Negative     INFLUENZA B PCR Negative     RSV PCR Negative    Narrative:      FOR PEDIATRIC PATIENTS - copy/paste COVID Guidelines URL to browser: [email or web address]    SARS-CoV-2 assay is a Nucleic Acid Amplification assay intended for the  qualitative detection of nucleic acid from SARS-CoV-2 in nasopharyngeal  swabs. Results are for the presumptive identification of SARS-CoV-2 RNA.    Positive results are indicative of infection with SARS-CoV-2, the virus  causing COVID-19, but do not rule out bacterial infection or co-infection  with other viruses. Laboratories within the [place] and its  territories are required to report all positive results to the appropriate  public health authorities. Negative results do not preclude SARS-CoV-2  infection and should not be used as the sole basis for treatment or other  patient management decisions. Negative results must be combined with  clinical observations, patient history, and epidemiological information.  This test has not been FDA cleared or approved.    This test has been authorized by FDA under an Emergency Use Authorization  (EUA). This test is only  authorized for the duration of time the  declaration that circumstances exist justifying the authorization of the  emergency use of an in vitro diagnostic tests for detection of SARS-CoV-2  virus and/or diagnosis of COVID-19 infection under section 564(b)(1) of  the Act, 21 U.S.C. 360bbb-3(b)(1), unless the authorization is terminated  or revoked sooner. The test has been validated but independent review by FDA  and CLIA is pending.    Test performed using [employer] GeneXpert: This RT-PCR assay targets N2,  a region unique to SARS-CoV-2. A conserved region in the E-gene was chosen  for pan-Sarbecovirus detection which includes SARS-CoV-2.    According to CMS-2020-01-R, this platform meets the definition of high-throughput technology.    Procalcitonin [[id number]]  (Normal) Collected: 01/01/24 0953    Lab Status: Final result Specimen: Blood from Arm, Right Updated: 01/01/24 1055     Procalcitonin <0.05 ng/ml     Lactic acid [[id number]]  (Abnormal) Collected: 01/01/24 0953    Lab Status: Final result Specimen: Blood from Arm, Right Updated: 01/01/24 1053     LACTIC ACID 2.7 mmol/L     Narrative:      Result may be elevated if tourniquet was used during collection.    hCG, qualitative pregnancy [[id number]]  (Normal) Collected: 01/01/24 0953    Lab Status: Final result Specimen: Blood from Arm, Right Updated: 01/01/24 1053     Preg, Serum Negative    Comprehensive metabolic panel [[id number]] Collected: 01/01/24 0953    Lab Status: Final result Specimen: Blood from Arm, Right Updated: 01/01/24 1044     Sodium 139 mmol/L      Potassium 3.9 mmol/L      Chloride 103 mmol/L      CO2 26 mmol/L      ANION GAP 10 mmol/L      BUN 10 mg/dL      Creatinine 0.70 mg/dL      Glucose 140 mg/dL      Calcium 9.3 mg/dL      AST 20 U/L      ALT 21 U/L      Alkaline Phosphatase 60 U/L      Total Protein 7.9 g/dL      Albumin 4.5 g/dL      Total Bilirubin 0.32 mg/dL      eGFR 116 ml/min/1.73sq m     Narrative:      National Kidney Disease  Foundation guidelines for Chronic Kidney Disease (CKD):     Stage 1 with normal or high GFR (GFR > 90 mL/min/1.73 square meters)    Stage 2 Mild CKD (GFR = 60-89 mL/min/1.73 square meters)    Stage 3A Moderate CKD (GFR = 45-59 mL/min/1.73 square meters)    Stage 3B Moderate CKD (GFR = 30-44 mL/min/1.73 square meters)    Stage 4 Severe CKD (GFR = 15-29 mL/min/1.73 square meters)    Stage 5 End Stage CKD (GFR <15 mL/min/1.73 square meters)  Note: GFR calculation is accurate only with a steady state creatinine    Protime-INR [[id number]]  (Normal) Collected: 01/01/24 0953    Lab Status: Final result Specimen: Blood from Arm, Right Updated: 01/01/24 1039     Protime 13.2 seconds      INR 0.94    APTT [[id number]]  (Normal) Collected: 01/01/24 0953    Lab Status: Final result Specimen: Blood from Arm, Right Updated: 01/01/24 1039     PTT 28 seconds     Blood gas, Venous [[id number]]  (Abnormal) Collected: 01/01/24 0953    Lab Status: Final result Specimen: Blood from Arm, Right Updated: 01/01/24 1028     pH, Dante 7.280     pCO2, Dante 52.7 mm Hg      pO2, Dante 46.2 mm Hg      HCO3, Dante 24.2 mmol/L      Base Excess, Dante -3.2 mmol/L      O2 Content, Dante 16.1 ml/dL      O2 HGB, VENOUS 77.2 %     CBC and differential [[id number]]  (Abnormal) Collected: 01/01/24 0953    Lab Status: Final result Specimen: Blood from Arm, Right Updated: 01/01/24 1027     WBC 16.50 Thousand/uL      RBC 4.48 Million/uL      Hemoglobin 14.0 g/dL      Hematocrit 41.6 %      MCV 93 fL      MCH 31.3 pg      MCHC 33.7 g/dL      RDW 12.1 %      MPV 10.4 fL      Platelets 320 Thousands/uL      nRBC 0 /100 WBCs      Neutrophils Relative 82 %      Immat GRANS % 0 %      Lymphocytes Relative 10 %      Monocytes Relative 4 %      Eosinophils Relative 3 %      Basophils Relative 1 %      Neutrophils Absolute 13.44 Thousands/µL      Immature Grans Absolute 0.07 Thousand/uL      Lymphocytes Absolute 1.72 Thousands/µL      Monocytes Absolute 0.73 Thousand/µL       Eosinophils Absolute 0.45 Thousand/µL      Basophils Absolute 0.09 Thousands/µL                    CTA ED chest PE Study   Final Result by Mary Conley MD (01/01 1221)      No PE or acute pulmonary pathology.      Right T12 rib fracture. Indeterminate age. Correlate clinically for recent trauma and point tenderness.                  Workstation performed: [id number]         XR chest 1 view portable   Final Result by Mary Conley MD (01/01 1222)   No acute cardiopulmonary findings.                  Workstation performed: [id number]                    Procedures  CriticalCare Time    Date/Time: 1/4/2024 9:52 AM    Performed by: Radha Riojas DO  Authorized by: Radha Riojas DO    Critical care provider statement:     Critical care time (minutes):  90    Critical care start time:  1/1/2024 11:15 AM    Critical care end time:  1/1/2024 12:45 AM    Critical care time was exclusive of:  Separately billable procedures and treating other patients and teaching time    Critical care was necessary to treat or prevent imminent or life-threatening deterioration of the following conditions:  Respiratory failure    Critical care was time spent personally by me on the following activities:  Re-evaluation of patient's condition, discussions with consultants, obtaining history from patient or surrogate, development of treatment plan with patient or surrogate, examination of patient, evaluation of patient's response to treatment, ordering and performing treatments and interventions, ordering and review of laboratory studies and ordering and review of radiographic studies           ED Course  ED Course as of 01/04/24 0956   Mon Jan 01, 2024   1029 WBC(!): 16.50                            Initial Sepsis Screening       Row Name 01/01/24 1421                Is the patient's history suggestive of a new or worsening infection? Yes (Proceed)  -KS        Suspected source of infection pneumonia  -KS        Indicate SIRS criteria  "Tachycardia > 90 bpm;Tachypnea > 20 resp per min;Leukocytosis (WBC > 98226 IJL) OR Leukopenia (WBC <4000 IJL) OR Bandemia (WBC >10% bands)  -KS        Are two or more of the above signs & symptoms of infection both present and new to the patient? Yes (Proceed)  -KS        Assess for evidence of organ dysfunction: Are any of the below criteria present within 6 hours of suspected infection and SIRS criteria that are NOT considered to be chronic conditions? New need for invasive/non-invasive ventilation;Lactate > 2.0  -KS        Date of presentation of severe sepsis 01/01/24  -KS        Time of presentation of severe sepsis 1421  -KS        Sepsis Note: Click \"NEXT\" below (NOT \"close\") to generate sepsis note based on above information. YES (proceed by clicking \"NEXT\")  -KS                  User Key  (r) = Recorded By, (t) = Taken By, (c) = Cosigned By      Initials Name Provider Type    GHASSAN Bhakta Nurse Practitioner                    SBIRT 20yo+      Flowsheet Row Most Recent Value   Initial Alcohol Screen: US AUDIT-C     1. How often do you have a drink containing alcohol? 2 Filed at: 01/01/2024 1021   2. How many drinks containing alcohol do you have on a typical day you are drinking?  2 Filed at: 01/01/2024 1021   3b. FEMALE Any Age, or MALE 65+: How often do you have 4 or more drinks on one occassion? 0 Filed at: 01/01/2024 1021   Audit-C Score 4 Filed at: 01/01/2024 1021   SUYAPA: How many times in the past year have you...    Used an illegal drug or used a prescription medication for non-medical reasons? Never Filed at: 01/01/2024 1021                      Medical Decision Making  28-year-old female in severe respiratory distress-will get labs, COVID/flu/RSV, CT scan, VBG, and give breathing treatment, mag, Solu-Medrol, and reassess.    Patient appears very anxious and is crying.  Small dose of Ativan was given and improved patient significantly.  Upon reevaluation she stated that she felt improved " however oxygen saturation dropped to 89/90% consistently on 4 L.  Respiratory contacted to place patient on mid flow.  Called by nurse shortly after that she complained of worsening shortness of breath.  Another breathing treatment and small dose of Ativan was given.  ICU contacted for evaluation and admission.    Amount and/or Complexity of Data Reviewed  Labs: ordered. Decision-making details documented in ED Course.  Radiology: ordered.    Risk  Prescription drug management.  Decision regarding hospitalization.             Disposition  Final diagnoses:   Asthma exacerbation     Time reflects when diagnosis was documented in both MDM as applicable and the Disposition within this note       Time User Action Codes Description Comment    1/1/2024  1:23 PM Radha Riojas Add [J45.901] Asthma exacerbation     1/2/2024  7:53 AM Etta Ponce Add [J45.901] Severe asthma with exacerbation           ED Disposition       ED Disposition   Admit    Condition   Stable    Date/Time   Mon Jan 1, 2024 1323    Comment   Case was discussed with ARIAN and the patient's admission status was agreed to be Admission Status: inpatient status to the service of Dr. Reveles.               Follow-up Information       Follow up With Specialties Details Why Contact Info    PCP  Schedule an appointment as soon as possible for a visit in 1 week(s)              Discharge Medication List as of 1/3/2024 10:49 AM        START taking these medications    Details   albuterol (2.5 mg/3 mL) 0.083 % nebulizer solution Take 3 mL (2.5 mg total) by nebulization every 4 (four) hours as needed for wheezing, Starting Wed 1/3/2024, Until Fri 2/2/2024 at 2359, Normal      fluticasone-vilanterol 200-25 mcg/actuation inhaler Inhale 1 puff daily Rinse mouth after use., Starting Wed 1/3/2024, No Print      ipratropium (ATROVENT) 0.02 % nebulizer solution Take 2.5 mL (0.5 mg total) by nebulization 3 (three) times a day, Starting Wed 1/3/2024, Until Fri  2/2/2024, Normal      levalbuterol (XOPENEX) 1.25 mg/3 mL nebulizer solution Take 3 mL (1.25 mg total) by nebulization 3 (three) times a day, Starting Wed 1/3/2024, Until Fri 2/2/2024, Normal      predniSONE 20 mg tablet Take 2 tablets (40 mg total) by mouth daily for 6 days, Starting Thu 1/4/2024, Until Wed 1/10/2024, Normal           CONTINUE these medications which have NOT CHANGED    Details   buPROPion (WELLBUTRIN XL) 150 mg 24 hr tablet Take 150 mg by mouth daily, Historical Med             Outpatient Discharge Orders   Discharge Diet     Activity as tolerated     Call provider for:  difficulty breathing, headache or visual disturbances       PDMP Review         Value Time User    PDMP Reviewed  Yes 1/3/2024  9:15 AM GHASSAN Luo            ED Provider  Electronically Signed by             Radha Riojas DO  01/04/24 0956

## 2024-01-01 NOTE — ASSESSMENT & PLAN NOTE
Right T 12 rib fracture likely subacute from October  No history of fall or injury   Likely secondary to aggressive coughing   Supportive care   Lidocaine patch

## 2024-01-01 NOTE — ASSESSMENT & PLAN NOTE
Patient has history of inhaler use as a child and teenager for exercise induced asthma.  However, she has not required its use for several years   Has never been intubated   Etiology viral infection vs persistent pneumonia vs. Hypersensitivity reaction to cat dander   Diffuse wheezing and increased work of breathing on presentation   Slight improvement with albuterol and solumedrol given in the ED.  However, continues to have increased work of breathing   Will initiate BIPAP for respiratory support  Terbutaline x 1 and IV mag   Albuterol nebs 5 mg q 2 hours   Will continue solumedrol 60 mg IV q 8 hours   Will initiate epinephrine gtt at 2 mcg/min   Will repeat ABG

## 2024-01-01 NOTE — RESPIRATORY THERAPY NOTE
01/01/24 1549   Respiratory Assessment   Assessment Type Assess only   General Appearance Drowsy   Respiratory Pattern Tachypneic   Chest Assessment Chest expansion symmetrical   Bilateral Breath Sounds Diminished;Expiratory wheezes   Resp Comments remains on Bipap, patient tachypneic but appears comfortable, abg obtained, neb tx given, spo2 97%   O2 Device v60   Non-Invasive Information   O2 Interface Device Face mask   Non-Invasive Ventilation Mode BiPAP   SpO2 97 %   $ Pulse Oximetry Spot Check Charge Completed   Non-Invasive Settings   IPAP (cm) 10 cm   EPAP (cm) 5 cm   Rate (Set) 8   FiO2 (%) 40   Pressure Support (cm H2O) 5   Inspiratory Time (Set) 1   Non-Invasive Readings   Skin Intervention Skin intact   Total Rate 29   MV (Mech) 20.6   Peak Pressure (Obs) 12   Spontaneous Vt (mL) 706   Leak (lpm) 0   Non-Invasive Alarms   Insp Pressure High (cm H20) 25   Insp Pressure Low (cm H20) 6   Low Insp Pressure Time (sec) 20 sec   MV Low (L/min) 3   Vt High (mL) 1200   Vt Low (mL) 200   High Resp Rate (BPM) 50 BPM   Low Resp Rate (BPM) 8 BPM

## 2024-01-01 NOTE — PLAN OF CARE
Problem: Potential for Falls  Goal: Patient will remain free of falls  Description: INTERVENTIONS:  - Educate patient/family on patient safety including physical limitations  - Instruct patient to call for assistance with activity   - Consult OT/PT to assist with strengthening/mobility   - Keep Call bell within reach  - Keep bed low and locked with side rails adjusted as appropriate  - Keep care items and personal belongings within reach  - Initiate and maintain comfort rounds  - Make Fall Risk Sign visible to staff  - Offer Toileting every 3 Hours, in advance of need  - Initiate/Maintain bed alarm  - Obtain necessary fall risk management equipment:   - Apply yellow socks and bracelet for high fall risk patients  - Consider moving patient to room near nurses station  Outcome: Progressing     Problem: PAIN - ADULT  Goal: Verbalizes/displays adequate comfort level or baseline comfort level  Description: Interventions:  - Encourage patient to monitor pain and request assistance  - Assess pain using appropriate pain scale  - Administer analgesics based on type and severity of pain and evaluate response  - Implement non-pharmacological measures as appropriate and evaluate response  - Consider cultural and social influences on pain and pain management  - Notify physician/advanced practitioner if interventions unsuccessful or patient reports new pain  Outcome: Progressing     Problem: INFECTION - ADULT  Goal: Absence or prevention of progression during hospitalization  Description: INTERVENTIONS:  - Assess and monitor for signs and symptoms of infection  - Monitor lab/diagnostic results  - Monitor all insertion sites, i.e. indwelling lines, tubes, and drains  - Monitor endotracheal if appropriate and nasal secretions for changes in amount and color  - [place] appropriate cooling/warming therapies per order  - Administer medications as ordered  - Instruct and encourage patient and family to use good hand hygiene  technique  - Identify and instruct in appropriate isolation precautions for identified infection/condition  Outcome: Progressing     Problem: SAFETY ADULT  Goal: Patient will remain free of falls  Description: INTERVENTIONS:  - Educate patient/family on patient safety including physical limitations  - Instruct patient to call for assistance with activity   - Consult OT/PT to assist with strengthening/mobility   - Keep Call bell within reach  - Keep bed low and locked with side rails adjusted as appropriate  - Keep care items and personal belongings within reach  - Initiate and maintain comfort rounds  - Make Fall Risk Sign visible to staff  - Offer Toileting every 3 Hours, in advance of need  - Initiate/Maintain bed alarm  - Obtain necessary fall risk management equipment:   - Apply yellow socks and bracelet for high fall risk patients  - Consider moving patient to room near nurses station  Outcome: Progressing  Goal: Maintain or return to baseline ADL function  Description: INTERVENTIONS:  -  Assess patient's ability to carry out ADLs; assess patient's baseline for ADL function and identify physical deficits which impact ability to perform ADLs (bathing, care of mouth/teeth, toileting, grooming, dressing, etc.)  - Assess/evaluate cause of self-care deficits   - Assess range of motion  - Assess patient's mobility; develop plan if impaired  - Assess patient's need for assistive devices and provide as appropriate  - Encourage maximum independence but intervene and supervise when necessary  - Involve family in performance of ADLs  - Assess for home care needs following discharge   - Consider OT consult to assist with ADL evaluation and planning for discharge  - Provide patient education as appropriate  Outcome: Progressing  Goal: Maintains/Returns to pre admission functional level  Description: INTERVENTIONS:  - Perform AM-PAC 6 Click Basic Mobility/ Daily Activity assessment daily.  - Set and communicate daily  mobility goal to care team and patient/family/caregiver.   - Collaborate with rehabilitation services on mobility goals if consulted  - Reposition patient every 2 hours.  - Record patient progress and toleration of activity level   Outcome: Progressing     Problem: DISCHARGE PLANNING  Goal: Discharge to home or other facility with appropriate resources  Description: INTERVENTIONS:  - Identify barriers to discharge w/patient and caregiver  - Arrange for needed discharge resources and transportation as appropriate  - Identify discharge learning needs (meds, wound care, etc.)  - Arrange for interpretive services to assist at discharge as needed  - Refer to Case Management Department for coordinating discharge planning if the patient needs post-hospital services based on physician/advanced practitioner order or complex needs related to functional status, cognitive ability, or social support system  Outcome: Progressing     Problem: Knowledge Deficit  Goal: Patient/family/caregiver demonstrates understanding of disease process, treatment plan, medications, and discharge instructions  Description: Complete learning assessment and assess knowledge base.  Interventions:  - Provide teaching at level of understanding  - Provide teaching via preferred learning methods  Outcome: Progressing     Problem: RESPIRATORY - ADULT  Goal: Achieves optimal ventilation and oxygenation  Description: INTERVENTIONS:  - Assess for changes in respiratory status  - Assess for changes in mentation and behavior  - Position to facilitate oxygenation and minimize respiratory effort  - Oxygen administered by appropriate delivery if ordered  - Initiate smoking cessation education as indicated  - Encourage broncho-pulmonary hygiene including cough, deep breathe, Incentive Spirometry  - Assess the need for suctioning and aspirate as needed  - Assess and instruct to report SOB or any respiratory difficulty  - Respiratory Therapy support as  indicated  Outcome: Progressing

## 2024-01-01 NOTE — ASSESSMENT & PLAN NOTE
Likely type B lactic acidosis in the setting of frequent albuterol   However cannot rule out infectious etiology   Received 1 L NS in the ED, will continue with maintenance IVF  Trend until reaches end point

## 2024-01-01 NOTE — RESPIRATORY THERAPY NOTE
01/01/24 1415   Respiratory Assessment   Assessment Type Assess only   General Appearance Alert;Awake   Respiratory Pattern Tachypneic  (appears anxious)   Chest Assessment Chest expansion symmetrical   Bilateral Breath Sounds Expiratory wheezes   Resp Comments placed on Bipap for increased wob   O2 Device v60   Non-Invasive Information   O2 Interface Device Face mask   Non-Invasive Ventilation Mode BiPAP   $ Continous NIV Initial   SpO2 98 %   $ Pulse Oximetry Spot Check Charge Completed   Non-Invasive Settings   IPAP (cm) 10 cm   EPAP (cm) 5 cm   Rate (Set) 8   FiO2 (%) 40   Pressure Support (cm H2O) 5   Inspiratory Time (Set) 1   Non-Invasive Readings   Skin Intervention Skin intact   Total Rate 29   MV (Mech) 24.9   Peak Pressure (Obs) 11   Spontaneous Vt (mL) 751   Leak (lpm) 0   Non-Invasive Alarms   Insp Pressure High (cm H20) 25   Insp Pressure Low (cm H20) 6   Low Insp Pressure Time (sec) 20 sec   MV Low (L/min) 3   Vt High (mL) 1000   Vt Low (mL) 200   High Resp Rate (BPM) 40 BPM   Low Resp Rate (BPM) 8 BPM

## 2024-01-01 NOTE — SEPSIS NOTE
"  Sepsis Note   Domenica De Los Santos 28 y.o. female MRN: [id number]  Unit/Bed#:  Encounter: [id number]       Initial Sepsis Screening       Row Name 01/01/24 1421                Is the patient's history suggestive of a new or worsening infection? Yes (Proceed)  -KS        Suspected source of infection pneumonia  -KS        Indicate SIRS criteria Tachycardia > 90 bpm;Tachypnea > 20 resp per min;Leukocytosis (WBC > 15533 IJL) OR Leukopenia (WBC <4000 IJL) OR Bandemia (WBC >10% bands)  -KS        Are two or more of the above signs & symptoms of infection both present and new to the patient? Yes (Proceed)  -KS        Assess for evidence of organ dysfunction: Are any of the below criteria present within 6 hours of suspected infection and SIRS criteria that are NOT considered to be chronic conditions? New need for invasive/non-invasive ventilation;Lactate > 2.0  -KS        Date of presentation of severe sepsis 01/01/24  -KS        Time of presentation of severe sepsis 1421  -KS        Sepsis Note: Click \"NEXT\" below (NOT \"close\") to generate sepsis note based on above information. YES (proceed by clicking \"NEXT\")  -KS                  User Key  (r) = Recorded By, (t) = Taken By, (c) = Cosigned By      Initials Name Provider Type    GHASSAN Bhakta Nurse Practitioner                        Body mass index is 40.74 kg/m².  Wt Readings from Last 1 Encounters:   01/01/24 104 kg (230 lb)     IBW (Ideal Body Weight): 52.4 kg    Ideal body weight: 52.4 kg (115 lb 8.3 oz)  Adjusted ideal body weight: 73.2 kg (161 lb 5 oz)    "

## 2024-01-02 ENCOUNTER — APPOINTMENT (INPATIENT)
Dept: NON INVASIVE DIAGNOSTICS | Facility: HOSPITAL | Age: 29
DRG: 189 | End: 2024-01-02
Payer: COMMERCIAL

## 2024-01-02 LAB
ALBUMIN SERPL BCP-MCNC: 3.8 G/DL (ref 3.5–5)
ALP SERPL-CCNC: 45 U/L (ref 34–104)
ALT SERPL W P-5'-P-CCNC: 15 U/L (ref 7–52)
ANION GAP SERPL CALCULATED.3IONS-SCNC: 7 MMOL/L
AORTIC ROOT: 2.7 CM
APICAL FOUR CHAMBER EJECTION FRACTION: 76 %
ASCENDING AORTA: 3.1 CM
AST SERPL W P-5'-P-CCNC: 16 U/L (ref 13–39)
B PARAP IS1001 DNA NPH QL NAA+NON-PROBE: NOT DETECTED
B PERT.PT PRMT NPH QL NAA+NON-PROBE: NOT DETECTED
BACTERIA SPT RESP CULT: ABNORMAL
BASOPHILS # BLD AUTO: 0.02 THOUSANDS/ÂΜL (ref 0–0.1)
BASOPHILS NFR BLD AUTO: 0 % (ref 0–1)
BILIRUB SERPL-MCNC: 0.35 MG/DL (ref 0.2–1)
BUN SERPL-MCNC: 7 MG/DL (ref 5–25)
C PNEUM DNA NPH QL NAA+NON-PROBE: NOT DETECTED
CA-I BLD-SCNC: 1.12 MMOL/L (ref 1.12–1.32)
CALCIUM SERPL-MCNC: 8.1 MG/DL (ref 8.4–10.2)
CHLORIDE SERPL-SCNC: 110 MMOL/L (ref 96–108)
CHOLEST SERPL-MCNC: 202 MG/DL
CO2 SERPL-SCNC: 20 MMOL/L (ref 21–32)
CREAT SERPL-MCNC: 0.54 MG/DL (ref 0.6–1.3)
E WAVE DECELERATION TIME: 57 MS
E/A RATIO: 0.59
EOSINOPHIL # BLD AUTO: 0 THOUSAND/ÂΜL (ref 0–0.61)
EOSINOPHIL NFR BLD AUTO: 0 % (ref 0–6)
ERYTHROCYTE [DISTWIDTH] IN BLOOD BY AUTOMATED COUNT: 13 % (ref 11.6–15.1)
EST. AVERAGE GLUCOSE BLD GHB EST-MCNC: 111 MG/DL
FLUAV RNA NPH QL NAA+NON-PROBE: NOT DETECTED
FLUBV RNA NPH QL NAA+NON-PROBE: NOT DETECTED
FRACTIONAL SHORTENING: 38 (ref 28–44)
GFR SERPL CREATININE-BSD FRML MDRD: 127 ML/MIN/1.73SQ M
GLUCOSE SERPL-MCNC: 139 MG/DL (ref 65–140)
GLUCOSE SERPL-MCNC: 141 MG/DL (ref 65–140)
GLUCOSE SERPL-MCNC: 156 MG/DL (ref 65–140)
GLUCOSE SERPL-MCNC: 175 MG/DL (ref 65–140)
GRAM STN SPEC: ABNORMAL
HADV DNA NPH QL NAA+NON-PROBE: NOT DETECTED
HBA1C MFR BLD: 5.5 %
HCOV 229E RNA NPH QL NAA+NON-PROBE: NOT DETECTED
HCOV HKU1 RNA NPH QL NAA+NON-PROBE: NOT DETECTED
HCOV NL63 RNA NPH QL NAA+NON-PROBE: NOT DETECTED
HCOV OC43 RNA NPH QL NAA+NON-PROBE: NOT DETECTED
HCT VFR BLD AUTO: 32.2 % (ref 34.8–46.1)
HDLC SERPL-MCNC: 58 MG/DL
HGB BLD-MCNC: 10.9 G/DL (ref 11.5–15.4)
HMPV RNA NPH QL NAA+NON-PROBE: NOT DETECTED
HPIV1 RNA NPH QL NAA+NON-PROBE: NOT DETECTED
HPIV2 RNA NPH QL NAA+NON-PROBE: NOT DETECTED
HPIV3 RNA NPH QL NAA+NON-PROBE: NOT DETECTED
HPIV4 RNA NPH QL NAA+NON-PROBE: NOT DETECTED
IMM GRANULOCYTES # BLD AUTO: 0.07 THOUSAND/UL (ref 0–0.2)
IMM GRANULOCYTES NFR BLD AUTO: 0 % (ref 0–2)
INTERVENTRICULAR SEPTUM IN DIASTOLE (PARASTERNAL SHORT AXIS VIEW): 0.9 CM
INTERVENTRICULAR SEPTUM: 0.9 CM (ref 0.6–1.1)
L PNEUMO1 AG UR QL IA.RAPID: NEGATIVE
LA/AORTA RATIO 2D: 1.52
LAAS-AP4: 22.1 CM2
LACTATE SERPL-SCNC: 2.5 MMOL/L (ref 0.5–2)
LDLC SERPL CALC-MCNC: 127 MG/DL (ref 0–100)
LEFT ATRIUM SIZE: 4.1 CM
LEFT INTERNAL DIMENSION IN SYSTOLE: 2.9 CM (ref 2.1–4)
LEFT VENTRICULAR INTERNAL DIMENSION IN DIASTOLE: 4.7 CM (ref 3.5–6)
LEFT VENTRICULAR POSTERIOR WALL IN END DIASTOLE: 0.9 CM
LEFT VENTRICULAR STROKE VOLUME: 70 ML
LVSV (TEICH): 70 ML
LYMPHOCYTES # BLD AUTO: 1.09 THOUSANDS/ÂΜL (ref 0.6–4.47)
LYMPHOCYTES NFR BLD AUTO: 7 % (ref 14–44)
M PNEUMO DNA NPH QL NAA+NON-PROBE: NOT DETECTED
MAGNESIUM SERPL-MCNC: 2.3 MG/DL (ref 1.9–2.7)
MCH RBC QN AUTO: 30.8 PG (ref 26.8–34.3)
MCHC RBC AUTO-ENTMCNC: 33.5 G/DL (ref 31.4–37.4)
MCV RBC AUTO: 92 FL (ref 82–98)
MONOCYTES # BLD AUTO: 0.41 THOUSAND/ÂΜL (ref 0.17–1.22)
MONOCYTES NFR BLD AUTO: 3 % (ref 4–12)
MV E'TISSUE VEL-SEP: 22 CM/S
MV PEAK A VEL: 1.37 M/S
MV PEAK E VEL: 81 CM/S
MV STENOSIS PRESSURE HALF TIME: 17 MS
MV VALVE AREA P 1/2 METHOD: 12.94
NEUTROPHILS # BLD AUTO: 14.2 THOUSANDS/ÂΜL (ref 1.85–7.62)
NEUTS SEG NFR BLD AUTO: 90 % (ref 43–75)
NONHDLC SERPL-MCNC: 144 MG/DL
NRBC BLD AUTO-RTO: 0 /100 WBCS
PHOSPHATE SERPL-MCNC: 2.1 MG/DL (ref 2.7–4.5)
PLATELET # BLD AUTO: 227 THOUSANDS/UL (ref 149–390)
PMV BLD AUTO: 10.6 FL (ref 8.9–12.7)
POTASSIUM SERPL-SCNC: 3.8 MMOL/L (ref 3.5–5.3)
PROCALCITONIN SERPL-MCNC: 0.05 NG/ML
PROT SERPL-MCNC: 6.2 G/DL (ref 6.4–8.4)
RBC # BLD AUTO: 3.51 MILLION/UL (ref 3.81–5.12)
RIGHT VENTRICLE ID DIMENSION: 2.8 CM
RSV RNA NPH QL NAA+NON-PROBE: NOT DETECTED
RV+EV RNA NPH QL NAA+NON-PROBE: NOT DETECTED
S PNEUM AG UR QL: NEGATIVE
SARS-COV-2 RNA NPH QL NAA+NON-PROBE: NOT DETECTED
SL CV LV EF: 65
SL CV PED ECHO LEFT VENTRICLE DIASTOLIC VOLUME (MOD BIPLANE) 2D: 103 ML
SL CV PED ECHO LEFT VENTRICLE SYSTOLIC VOLUME (MOD BIPLANE) 2D: 33 ML
SODIUM SERPL-SCNC: 137 MMOL/L (ref 135–147)
TRICUSPID ANNULAR PLANE SYSTOLIC EXCURSION: 3.4 CM
TRIGL SERPL-MCNC: 85 MG/DL
WBC # BLD AUTO: 15.79 THOUSAND/UL (ref 4.31–10.16)

## 2024-01-02 PROCEDURE — 80053 COMPREHEN METABOLIC PANEL: CPT | Performed by: NURSE PRACTITIONER

## 2024-01-02 PROCEDURE — 80061 LIPID PANEL: CPT | Performed by: NURSE PRACTITIONER

## 2024-01-02 PROCEDURE — 99223 1ST HOSP IP/OBS HIGH 75: CPT | Performed by: INTERNAL MEDICINE

## 2024-01-02 PROCEDURE — NC001 PR NO CHARGE

## 2024-01-02 PROCEDURE — 99233 SBSQ HOSP IP/OBS HIGH 50: CPT | Performed by: STUDENT IN AN ORGANIZED HEALTH CARE EDUCATION/TRAINING PROGRAM

## 2024-01-02 PROCEDURE — 94664 DEMO&/EVAL PT USE INHALER: CPT

## 2024-01-02 PROCEDURE — 87449 NOS EACH ORGANISM AG IA: CPT

## 2024-01-02 PROCEDURE — 84100 ASSAY OF PHOSPHORUS: CPT | Performed by: NURSE PRACTITIONER

## 2024-01-02 PROCEDURE — 83036 HEMOGLOBIN GLYCOSYLATED A1C: CPT | Performed by: NURSE PRACTITIONER

## 2024-01-02 PROCEDURE — 94640 AIRWAY INHALATION TREATMENT: CPT

## 2024-01-02 PROCEDURE — 94760 N-INVAS EAR/PLS OXIMETRY 1: CPT

## 2024-01-02 PROCEDURE — 85025 COMPLETE CBC W/AUTO DIFF WBC: CPT | Performed by: NURSE PRACTITIONER

## 2024-01-02 PROCEDURE — 83735 ASSAY OF MAGNESIUM: CPT | Performed by: NURSE PRACTITIONER

## 2024-01-02 PROCEDURE — 82948 REAGENT STRIP/BLOOD GLUCOSE: CPT

## 2024-01-02 PROCEDURE — 82330 ASSAY OF CALCIUM: CPT | Performed by: NURSE PRACTITIONER

## 2024-01-02 PROCEDURE — 84145 PROCALCITONIN (PCT): CPT | Performed by: NURSE PRACTITIONER

## 2024-01-02 PROCEDURE — 93306 TTE W/DOPPLER COMPLETE: CPT | Performed by: INTERNAL MEDICINE

## 2024-01-02 PROCEDURE — 93306 TTE W/DOPPLER COMPLETE: CPT

## 2024-01-02 PROCEDURE — 87205 SMEAR GRAM STAIN: CPT | Performed by: NURSE PRACTITIONER

## 2024-01-02 RX ORDER — LANOLIN ALCOHOL/MO/W.PET/CERES
6 CREAM (GRAM) TOPICAL
Status: DISCONTINUED | OUTPATIENT
Start: 2024-01-02 | End: 2024-01-03 | Stop reason: HOSPADM

## 2024-01-02 RX ORDER — PREDNISONE 20 MG/1
40 TABLET ORAL DAILY
Status: DISCONTINUED | OUTPATIENT
Start: 2024-01-03 | End: 2024-01-03 | Stop reason: HOSPADM

## 2024-01-02 RX ORDER — GUAIFENESIN/DEXTROMETHORPHAN 100-10MG/5
10 SYRUP ORAL EVERY 4 HOURS PRN
Status: DISCONTINUED | OUTPATIENT
Start: 2024-01-02 | End: 2024-01-03 | Stop reason: HOSPADM

## 2024-01-02 RX ORDER — LEVALBUTEROL INHALATION SOLUTION 1.25 MG/3ML
1.25 SOLUTION RESPIRATORY (INHALATION)
Status: DISCONTINUED | OUTPATIENT
Start: 2024-01-02 | End: 2024-01-03 | Stop reason: HOSPADM

## 2024-01-02 RX ORDER — METHYLPREDNISOLONE SODIUM SUCCINATE 40 MG/ML
40 INJECTION, POWDER, LYOPHILIZED, FOR SOLUTION INTRAMUSCULAR; INTRAVENOUS EVERY 12 HOURS SCHEDULED
Status: COMPLETED | OUTPATIENT
Start: 2024-01-02 | End: 2024-01-02

## 2024-01-02 RX ORDER — BUPROPION HYDROCHLORIDE 150 MG/1
150 TABLET ORAL DAILY
Status: DISCONTINUED | OUTPATIENT
Start: 2024-01-02 | End: 2024-01-03 | Stop reason: HOSPADM

## 2024-01-02 RX ORDER — LORAZEPAM 0.5 MG/1
0.5 TABLET ORAL ONCE
Status: COMPLETED | OUTPATIENT
Start: 2024-01-02 | End: 2024-01-02

## 2024-01-02 RX ORDER — ACETAMINOPHEN 325 MG/1
650 TABLET ORAL EVERY 6 HOURS PRN
Status: DISCONTINUED | OUTPATIENT
Start: 2024-01-02 | End: 2024-01-03 | Stop reason: HOSPADM

## 2024-01-02 RX ORDER — METHYLPREDNISOLONE SODIUM SUCCINATE 40 MG/ML
40 INJECTION, POWDER, LYOPHILIZED, FOR SOLUTION INTRAMUSCULAR; INTRAVENOUS EVERY 8 HOURS SCHEDULED
Status: DISCONTINUED | OUTPATIENT
Start: 2024-01-02 | End: 2024-01-02

## 2024-01-02 RX ADMIN — GUAIFENESIN AND DEXTROMETHORPHAN 10 ML: 100; 10 SYRUP ORAL at 21:33

## 2024-01-02 RX ADMIN — POTASSIUM & SODIUM PHOSPHATES POWDER PACK 280-160-250 MG 2 PACKET: 280-160-250 PACK at 08:06

## 2024-01-02 RX ADMIN — IPRATROPIUM BROMIDE 0.5 MG: 0.5 SOLUTION RESPIRATORY (INHALATION) at 19:39

## 2024-01-02 RX ADMIN — LEVALBUTEROL HYDROCHLORIDE 1.25 MG: 1.25 SOLUTION RESPIRATORY (INHALATION) at 14:39

## 2024-01-02 RX ADMIN — CHLORHEXIDINE GLUCONATE 0.12% ORAL RINSE 15 ML: 1.2 LIQUID ORAL at 21:33

## 2024-01-02 RX ADMIN — METHYLPREDNISOLONE SODIUM SUCCINATE 40 MG: 40 INJECTION, POWDER, FOR SOLUTION INTRAMUSCULAR; INTRAVENOUS at 21:33

## 2024-01-02 RX ADMIN — LEVALBUTEROL HYDROCHLORIDE 1.25 MG: 1.25 SOLUTION RESPIRATORY (INHALATION) at 07:27

## 2024-01-02 RX ADMIN — ACETAMINOPHEN 650 MG: 325 TABLET, FILM COATED ORAL at 21:33

## 2024-01-02 RX ADMIN — LIDOCAINE 5% 1 PATCH: 700 PATCH TOPICAL at 18:18

## 2024-01-02 RX ADMIN — HEPARIN SODIUM 7500 UNITS: 5000 INJECTION INTRAVENOUS; SUBCUTANEOUS at 14:51

## 2024-01-02 RX ADMIN — HEPARIN SODIUM 7500 UNITS: 5000 INJECTION INTRAVENOUS; SUBCUTANEOUS at 21:32

## 2024-01-02 RX ADMIN — AZITHROMYCIN MONOHYDRATE 500 MG: 500 INJECTION, POWDER, LYOPHILIZED, FOR SOLUTION INTRAVENOUS at 14:55

## 2024-01-02 RX ADMIN — METHYLPREDNISOLONE SODIUM SUCCINATE 60 MG: 125 INJECTION, POWDER, FOR SOLUTION INTRAMUSCULAR; INTRAVENOUS at 05:47

## 2024-01-02 RX ADMIN — ALBUTEROL SULFATE 2.5 MG: 2.5 SOLUTION RESPIRATORY (INHALATION) at 23:06

## 2024-01-02 RX ADMIN — IPRATROPIUM BROMIDE 0.5 MG: 0.5 SOLUTION RESPIRATORY (INHALATION) at 14:39

## 2024-01-02 RX ADMIN — HEPARIN SODIUM 7500 UNITS: 5000 INJECTION INTRAVENOUS; SUBCUTANEOUS at 05:46

## 2024-01-02 RX ADMIN — CHLORHEXIDINE GLUCONATE 0.12% ORAL RINSE 15 ML: 1.2 LIQUID ORAL at 08:06

## 2024-01-02 RX ADMIN — IPRATROPIUM BROMIDE 0.5 MG: 0.5 SOLUTION RESPIRATORY (INHALATION) at 07:27

## 2024-01-02 RX ADMIN — Medication 6 MG: at 21:33

## 2024-01-02 RX ADMIN — LEVALBUTEROL HYDROCHLORIDE 1.25 MG: 1.25 SOLUTION RESPIRATORY (INHALATION) at 02:24

## 2024-01-02 RX ADMIN — BUPROPION HYDROCHLORIDE 150 MG: 150 TABLET, EXTENDED RELEASE ORAL at 08:06

## 2024-01-02 RX ADMIN — IPRATROPIUM BROMIDE 0.5 MG: 0.5 SOLUTION RESPIRATORY (INHALATION) at 02:24

## 2024-01-02 RX ADMIN — ACETAMINOPHEN 650 MG: 325 TABLET, FILM COATED ORAL at 14:49

## 2024-01-02 RX ADMIN — SODIUM CHLORIDE, SODIUM GLUCONATE, SODIUM ACETATE, POTASSIUM CHLORIDE, MAGNESIUM CHLORIDE, SODIUM PHOSPHATE, DIBASIC, AND POTASSIUM PHOSPHATE 125 ML/HR: .53; .5; .37; .037; .03; .012; .00082 INJECTION, SOLUTION INTRAVENOUS at 04:27

## 2024-01-02 RX ADMIN — LORAZEPAM 0.5 MG: 0.5 TABLET ORAL at 22:58

## 2024-01-02 RX ADMIN — Medication 6 MG: at 00:58

## 2024-01-02 RX ADMIN — LEVALBUTEROL HYDROCHLORIDE 1.25 MG: 1.25 SOLUTION RESPIRATORY (INHALATION) at 19:39

## 2024-01-02 NOTE — ASSESSMENT & PLAN NOTE
As evidenced by tachycardia, tachypnea, leukocytosis and lactate > 2.0  Presenting lactate 2.7, will trend until reaches endpoint   This may all represent SIRS from her asthma exacerbation in addition to a type B lactic acidosis secondary to albuterol administration.  However, cannot rule out viral infection   Received 1 L NS in the emergency department, will continue with maintenance IVF   COVID/RSV/influenza PCR negative   Will check RP2 and MRSA-pending  Received levaquin in the ED, continue with azithromycin   Trend procalcitonin, WBCS and temperature curve

## 2024-01-02 NOTE — ASSESSMENT & PLAN NOTE
Patient has history of inhaler use as a child and teenager for exercise induced asthma.  However, she has not required its use for several years   Has never been intubated   Etiology viral infection vs persistent pneumonia vs. Hypersensitivity reaction to cat dander   Diffuse wheezing and increased work of breathing on presentation   Slight improvement with albuterol and solumedrol given in the ED.  However, continued to have increased work of breathing   Required BiPAP for ventilatory support due to work of breathing  Continue steroids

## 2024-01-02 NOTE — PROGRESS NOTES
Critical Care Interval Transfer Note:    Please refer to progress note from earlier today for full details.     Barriers to discharge:   Pulmonology consult and follow up for long term management of asthma  Steroid taper  IV antibiotics requiring PO conversion vs discontinuation     Consults: IP CONSULT TO CASE MANAGEMENT  IP CONSULT TO PULMONOLOGY    Recommended to review admission imaging for incidental findings and document in discharge navigator: Chart reviewed, no known incidental findings noted at this time.      Discharge Plan: Anticipate discharge in 24-48 hrs to home.            Patient seen and evaluated by Critical Care today and deemed to be appropriate for transfer to Med Surg. Spoke to Dr. Lomas from [facility] to accept transfer. Critical care can be contacted via [facility] with any questions or concerns.

## 2024-01-02 NOTE — ASSESSMENT & PLAN NOTE
Likely type B lactic acidosis in the setting of frequent albuterol   Less likely infectious etiology   Fluid resuscitate  Improving

## 2024-01-02 NOTE — MALNUTRITION/BMI
This medical record reflects one or more clinical indicators suggestive of morbid obesity.      BMI Findings:  Adult BMI Classifications: Morbid Obesity 40-44.9        Body mass index is 40.74 kg/m².     See Nutrition note dated 1/2/24 for additional details.  Completed nutrition assessment is viewable in the nutrition documentation.

## 2024-01-02 NOTE — CONSULTS
Consultation - Pulmonary Medicine   Domenica De Los Santos 28 y.o. female MRN: [id number]  Unit/Bed#:  Encounter: [id number]      Assessment:  Acute hypoxemic respiratory failure, improved  Asthma with acute exacerbation  Recent pneumonia    Plan:   Acute hypoxic respiratory failure secondary to asthma exacerbation  Patient initially required BiPAP, currently on room air this morning with sats in the mid 90s  CTA done on admission shows no PE or acute pulmonary pathology, shows right T12 rib fracture  Her RP 2 panel as well as COVID/flu/RSV is negative, procalcitonin is normal  She had pneumonia back in October, felt she did not completely recover with ongoing cough, was exposed to several cats over the last few days which may have again exacerbated her asthma  Continue Solu-Medrol and can likely decrease to 40 every 12 hours  Continue Xopenex and Atrovent, would add long-acting bronchodilator Breo 200/25  Would also need nebulizer upon discharge  Would continue the long-acting bronchodilator upon discharge given her ongoing cough for the last couple of months  Negative procalcitonin, would consider monitoring off antibiotics  Would need outpatient follow-up for PFTs, [place] allergy panel, asthma management  Will continue to follow    History of Present Illness   Physician Requesting Consult: Holli Reveles MD  Reason for Consult / Principal Problem: Asthma exacerbation  Hx and PE limited by: None  HPI: Domenica De Los Santos is a 28 y.o. year old female non-smoker with past medical history of exercise-induced asthma who presents with complaint of worsening shortness of breath and cough over the last few days.  She was treated for pneumonia back in October, continued to have residual cough over the past couple of months.  She again was treated with steroids in November and has had an albuterol inhaler to use during this time.  She was exposed to several cats a few days ago and does have a known allergy to cats.   Felt her symptoms started to worsen again and she was having trouble breathing without any relief from her rescue inhaler.    She does have a history of exercise-induced asthma as a child into college.  Never been on long-acting bronchodilators.  She has not had to use her rescue inhaler in many years up until her recent pneumonia.  She does have a known allergy to cats.    Inpatient consult to Pulmonology  Consult performed by: Freedom Dooley PA-C  Consult ordered by: GHASSAN Giordano          Review of Systems   Constitutional: Negative.    HENT: Negative.     Respiratory:  Positive for cough and shortness of breath.    Cardiovascular: Negative.    Gastrointestinal: Negative.    Genitourinary: Negative.    Musculoskeletal: Negative.    Skin: Negative.    Allergic/Immunologic: Negative.    Neurological: Negative.    Psychiatric/Behavioral: Negative.         Historical Information   Past Medical History:   Diagnosis Date    Asthma      History reviewed. No pertinent surgical history.  Social History   Social History     Substance and Sexual Activity   Alcohol Use Never     Social History     Substance and Sexual Activity   Drug Use Yes    Types: Marijuana    Comment: infrequent     E-Cigarette/Vaping     E-Cigarette/Vaping Substances     Social History     Tobacco Use   Smoking Status Never   Smokeless Tobacco Never     Occupational History:     Family History: History reviewed. No pertinent family history.    Meds/Allergies   all current active meds have been reviewed, pertinent pulmonary meds have been reviewed, and current meds:   Current Facility-Administered Medications   Medication Dose Route Frequency    acetaminophen (TYLENOL) tablet 650 mg  650 mg Oral Q6H PRN    albuterol inhalation solution 2.5 mg  2.5 mg Nebulization Q4H PRN    azithromycin (ZITHROMAX) 500 mg in sodium chloride 0.9 % 250 mL IVPB  500 mg Intravenous Q24H    buPROPion (WELLBUTRIN XL) 24 hr tablet 150 mg  150 mg Oral Daily     "chlorhexidine (PERIDEX) 0.12 % oral rinse 15 mL  15 mL Mouth/Throat Q12H JODY    heparin (porcine) subcutaneous injection 7,500 Units  7,500 Units Subcutaneous Q8H JODY    ipratropium (ATROVENT) 0.02 % inhalation solution 0.5 mg  0.5 mg Nebulization TID    levalbuterol (XOPENEX) inhalation solution 1.25 mg  1.25 mg Nebulization TID    lidocaine (LIDODERM) 5 % patch 1 patch  1 patch Topical Daily    melatonin tablet 6 mg  6 mg Oral HS    methylPREDNISolone sodium succinate (Solu-MEDROL) injection 40 mg  40 mg Intravenous Q8H [facility]    potassium-sodium phosphates (PHOS-NAK) packet 2 packet  2 packet Oral BID With Meals       Allergies   Allergen Reactions    Penicillins Anaphylaxis       Objective   Vitals: Blood pressure 115/68, pulse (!) 123, temperature (!) 97.2 °F (36.2 °C), temperature source Oral, resp. rate (!) 26, height 5' 3\" (1.6 m), weight 104 kg (230 lb), SpO2 93%.,Body mass index is 40.74 kg/m².    Intake/Output Summary (Last 24 hours) at 1/2/2024 1151  Last data filed at 1/2/2024 0600  Gross per 24 hour   Intake 6022.41 ml   Output 1425 ml   Net 4597.41 ml     Invasive Devices       Peripheral Intravenous Line  Duration             Peripheral IV 01/01/24 Left;Ventral (anterior) Forearm 1 day    Peripheral IV 01/01/24 Right Antecubital 1 day                    Physical Exam  Vitals reviewed.   Constitutional:       General: She is not in acute distress.     Appearance: Normal appearance. She is not ill-appearing.   HENT:      Head: Normocephalic and atraumatic.      Mouth/Throat:      Pharynx: Oropharynx is clear.   Eyes:      Conjunctiva/sclera: Conjunctivae normal.   Cardiovascular:      Rate and Rhythm: Normal rate and regular rhythm.   Pulmonary:      Effort: Pulmonary effort is normal. No respiratory distress.      Breath sounds: Wheezing present. No rhonchi or rales.   Abdominal:      General: Abdomen is flat. There is no distension.   Musculoskeletal:         General: Normal range of motion.      " Cervical back: Normal range of motion.      Right lower leg: No edema.      Left lower leg: No edema.   Neurological:      Mental Status: She is alert and oriented to person, place, and time.   Psychiatric:         Mood and Affect: Mood normal.         Behavior: Behavior normal.         Lab Results: I have personally reviewed pertinent lab results., CBC:   Lab Results   Component Value Date    WBC 15.79 (H) 01/02/2024    HGB 10.9 (L) 01/02/2024    HCT 32.2 (L) 01/02/2024    MCV 92 01/02/2024     01/02/2024    RBC 3.51 (L) 01/02/2024    MCH 30.8 01/02/2024    MCHC 33.5 01/02/2024    RDW 13.0 01/02/2024    MPV 10.6 01/02/2024    NRBC 0 01/02/2024   , CMP:   Lab Results   Component Value Date    SODIUM 137 01/02/2024    K 3.8 01/02/2024     (H) 01/02/2024    CO2 20 (L) 01/02/2024    BUN 7 01/02/2024    CREATININE 0.54 (L) 01/02/2024    CALCIUM 8.1 (L) 01/02/2024    AST 16 01/02/2024    ALT 15 01/02/2024    ALKPHOS 45 01/02/2024    EGFR 127 01/02/2024     Imaging Studies: I have personally reviewed pertinent reports.   and I have personally reviewed pertinent films in PACS  EKG, Pathology, and Other Studies: I have personally reviewed pertinent reports.    VTE Prophylaxis: Sequential compression device (Venodyne)  and Heparin    Code Status: Level 1 - Full Code  Advance Directive and Living Will:      Power of :    POLST:

## 2024-01-02 NOTE — RESPIRATORY THERAPY NOTE
RT Protocol Note  Domenica De Los Santos 28 y.o. female MRN: [id number]  Unit/Bed#:  Encounter: [id number]    Assessment    Principal Problem:    Respiratory failure with hypoxia and hypercapnia (HCC)  Active Problems:    Severe asthma with exacerbation    Severe sepsis (HCC)    Lactic acidosis    Anxiety and depression    Fracture of one rib of right side      Home Pulmonary Medications:     01/02/24 0729   Respiratory Protocol   Protocol Initiated? No   Protocol Selection Respiratory   Language Barrier? No   Medical & Social History Reviewed? Yes   Diagnostic Studies Reviewed? Yes   Physical Assessment Performed? Yes   Respiratory Plan Mild Distress pathway   Respiratory Assessment   Assessment Type Pre-treatment   General Appearance Alert;Awake   Respiratory Pattern Normal   Chest Assessment Chest expansion symmetrical   Bilateral Breath Sounds Diminished   Resp Comments continue with current therapy, bbs diminshed few exp wheezes, spo2 94% room air   O2 Device ra   Additional Assessments   SpO2 94 %            Past Medical History:   Diagnosis Date    Asthma      Social History     Socioeconomic History    Marital status: Single     Spouse name: None    Number of children: None    Years of education: None    Highest education level: None   Occupational History    None   Tobacco Use    Smoking status: Never    Smokeless tobacco: Never   Substance and Sexual Activity    Alcohol use: None    Drug use: None    Sexual activity: None   Other Topics Concern    None   Social History Narrative    None     Social Determinants of Health     Financial Resource Strain: Unknown (6/6/2023)    Received from [facility]    Financial Resource Strain     In the last 12 months did you skip medications to save money?: No     In the last 12 months, was there a time when you needed to see a doctor but could not because of cost?: Unable/Decline to Answer   Food Insecurity: No Food Insecurity (11/6/2023)     "[facility]    Hunger Vital Sign     Worried About Running Out of Food in the Last Year: Never true     Ran Out of Food in the Last Year: Never true   Transportation Needs: Not At Risk (6/6/2023)    Received from [facility]    Transporation     In the last 12 months, have you ever had to go without healthcare because you didn't have a way to get there?: No   Physical Activity: Not on file   Stress: Not on file   Social Connections: Unknown (6/6/2023)    Received from [facility]    Social Connections     Do you often feel lonely?: Unable/Decline to Answer   Intimate Partner Violence: Not on file   Housing Stability: Not At Risk (6/6/2023)    Received from [facility]    Housing Stability     Are you worried that in the next 2 months you may not have stable housing?: No       Subjective    Subjective Data: awake and alerrt    Objective    Physical Exam:   Assessment Type: Pre-treatment  General Appearance: Alert, Awake  Respiratory Pattern: Normal  Chest Assessment: Chest expansion symmetrical  Bilateral Breath Sounds: Diminished  O2 Device: ra    Vitals:  Blood pressure 115/68, pulse (!) 123, temperature 98.2 °F (36.8 °C), resp. rate (!) 26, height 5' 3\" (1.6 m), weight 104 kg (230 lb), SpO2 91%.    Results from last 7 days   Lab Units 01/01/24  1547   PH ART  7.258*   PCO2 ART mm Hg 31.2*   PO2 ART mm Hg 133.7*   HCO3 ART mmol/L 13.6*   BASE EXC ART mmol/L -12.2   O2 CONTENT ART mL/dL 19.1   O2 HGB, ARTERIAL % 97.2*   ABG SOURCE  Radial, Left   ELIANA TEST  Yes       Imaging and other studies: I have personally reviewed pertinent reports.      O2 Device: ra     Plan    Respiratory Plan: Mild Distress pathway        Resp Comments: continue with current therapy, bbs diminshed few exp wheezes, spo2 94% room air   "

## 2024-01-02 NOTE — PROGRESS NOTES
[facility]  Progress Note  Name: Domenica De Los Santos I  MRN: [id number]  Unit/Bed#:  I Date of Admission: 1/1/2024   Date of Service: 1/2/2024 I Hospital Day: 1    Assessment/Plan   * Respiratory failure with hypoxia and hypercapnia (HCC)  Assessment & Plan  Secondary to acute asthma exacerbation   Patient recently has pneumonia in October and has had persistent cough since original diagnosis  Reports history of using rescue inhaler during sports activities as a child and teenager.  However, has not used her rescue inhaler since college  Patient also states that she has an allergy to cats and has been in the presence of cats the last two days  Etiology viral infectious vs residual pneumonia vs hypersensitivity reaction to cat dander   CT imaging negative for PE.  There does not appear to be active infiltrate noted on CT scan   Continues to have a leukocytosis.  This could represent stress response in the setting of asthma exacerbation. However, cannot completely rule out viral infectious process  COVID/RSV/influenza negative   Will check RP2 panel-pending  Check MRSA-pending  Continue with Azithromycin for abx coverage  Continue steroids    Severe asthma with exacerbation  Assessment & Plan  Patient has history of inhaler use as a child and teenager for exercise induced asthma.  However, she has not required its use for several years   Has never been intubated   Etiology viral infection vs persistent pneumonia vs. Hypersensitivity reaction to cat dander   Diffuse wheezing and increased work of breathing on presentation   Slight improvement with albuterol and solumedrol given in the ED.  However, continued to have increased work of breathing   Required BiPAP for ventilatory support due to work of breathing  Continue steroids    Severe sepsis (HCC)  Assessment & Plan  As evidenced by tachycardia, tachypnea, leukocytosis and lactate > 2.0  Presenting lactate 2.7, will trend until reaches  endpoint   This may all represent SIRS from her asthma exacerbation in addition to a type B lactic acidosis secondary to albuterol administration.  However, cannot rule out viral infection   Received 1 L NS in the emergency department, will continue with maintenance IVF   COVID/RSV/influenza PCR negative   Will check RP2 and MRSA-pending  Received levaquin in the ED, continue with azithromycin   Trend procalcitonin, WBCS and temperature curve     Lactic acidosis  Assessment & Plan  Likely type B lactic acidosis in the setting of frequent albuterol   Less likely infectious etiology   Fluid resuscitate  Improving    Fracture of one rib of right side  Assessment & Plan  Lidocaine patch for pain control    Anxiety and depression  Assessment & Plan  Resume Wellbutrin             Disposition: Stepdown Level 1    ICU Core Measures     A: Assess, Prevent, and Manage Pain Has pain been assessed? Yes  Need for changes to pain regimen? No   B: Both SAT/SAT  N/A   C: Choice of Sedation RASS Goal: 0 Alert and Calm  Need for changes to sedation or analgesia regimen? NA   D: Delirium CAM-ICU: Negative   E: Early Mobility  Plan for early mobility? Yes   F: Family Engagement Plan for family engagement today? Yes       Antibiotic Review: Continue broad spectrum secondary to severity of illness.       Prophylaxis:  VTE VTE covered by:  heparin (porcine), Subcutaneous, 7,500 Units at 01/01/24 2141       Stress Ulcer  not ordered         Significant 24hr Events     24hr events: Greatly improved overnight.  Weaned off BiPAP and nasal cannula oxygen.  May consider discharge if remains stable     Subjective   Review of Systems   Objective                            Vitals I/O      Most Recent Min/Max in 24hrs   Temp 98.2 °F (36.8 °C) Temp  Min: 98 °F (36.7 °C)  Max: 98.5 °F (36.9 °C)   Pulse (!) 107 Pulse  Min: 107  Max: 155   Resp (!) 26 Resp  Min: 24  Max: 35   /78 BP  Min: 90/55  Max: 182/87   O2 Sat 94 % SpO2  Min: 87 %  Max: 99  %      Intake/Output Summary (Last 24 hours) at 1/2/2024 0141  Last data filed at 1/2/2024 0000  Gross per 24 hour   Intake 5474.49 ml   Output 1425 ml   Net 4049.49 ml       Diet Clear Liquid    Invasive Monitoring           Physical Exam   Physical Exam  Vitals and nursing note reviewed.   Eyes:      General:         Right eye: No discharge.      Extraocular Movements: Extraocular movements intact.      Conjunctiva/sclera: Conjunctivae normal.      Pupils: Pupils are equal, round, and reactive to light.   Skin:     General: Skin is warm and dry.      Capillary Refill: Capillary refill takes less than 2 seconds.   HENT:      Head: Normocephalic and atraumatic.      Mouth/Throat:      Mouth: Mucous membranes are dry.   Cardiovascular:      Rate and Rhythm: Regular rhythm. Tachycardia present.      Pulses: Normal pulses.      Heart sounds: Normal heart sounds.   Musculoskeletal:      Right lower leg: Trace Edema present.      Left lower leg: Trace Edema present.   Abdominal: General: Bowel sounds are normal. There is no distension.      Palpations: Abdomen is soft.      Tenderness: There is no abdominal tenderness. There is no guarding.   Constitutional:       General: She is not in acute distress.     Appearance: She is well-developed and well-nourished. She is not ill-appearing.   Pulmonary:      Effort: Respiratory distress present. No accessory muscle usage or accessory muscle usage.      Breath sounds: Wheezing present.   Secretions are normal.Chest:      Chest wall: No tenderness.   Neurological:      General: No focal deficit present.      Mental Status: She is alert and oriented to person, place and time. Mental status is at baseline. She is calm.      Motor: gross motor function is at baseline for patient.            Diagnostic Studies      EKG: Sinus rhythm  Imaging:  I have personally reviewed pertinent reports.   and I have personally reviewed pertinent films in PACS     Medications:  Scheduled PRN    albuterol, 5 mg, Once  azithromycin, 500 mg, Q24H  buPROPion, 150 mg, Daily  chlorhexidine, 15 mL, Q12H JODY  heparin (porcine), 7,500 Units, Q8H JODY  ipratropium, 0.5 mg, Once  ipratropium, 0.5 mg, Q6H  levalbuterol, 1.25 mg, Q6H  lidocaine, 1 patch, Daily  melatonin, 6 mg, HS  methylPREDNISolone sodium succinate, 60 mg, Q8H JODY      albuterol, 2.5 mg, Q4H PRN       Continuous    multi-electrolyte, 125 mL/hr, Last Rate: 125 mL/hr (01/01/24 1845)         Labs:    CBC    Recent Labs     01/01/24  0953   WBC 16.50*   HGB 14.0   HCT 41.6        BMP    Recent Labs     01/01/24  0953 01/01/24  1805   SODIUM 139 137   K 3.9 3.8    106   CO2 26 14*   AGAP 10 17   BUN 10 9   CREATININE 0.70 0.81   CALCIUM 9.3 8.2*       Coags    Recent Labs     01/01/24  0953   INR 0.94   PTT 28        Additional Electrolytes  Recent Labs     01/01/24  1805   MG 2.6          Blood Gas    Recent Labs     01/01/24  1547   PHART 7.258*   RMO1OYM 31.2*   PO2ART 133.7*   JFI3EWV 13.6*   BEART -12.2   SOURCE Radial, Left     Recent Labs     01/01/24  0953 01/01/24  1547   PHVEN 7.280*  --    EAO4HJH 52.7*  --    PO2VEN 46.2*  --    RQO3SRN 24.2  --    BEVEN -3.2  --    J0FZOOQ 77.2  --    SOURCE  --  Radial, Left    LFTs  Recent Labs     01/01/24  0953   ALT 21   AST 20   ALKPHOS 60   ALB 4.5   TBILI 0.32       Infectious  Recent Labs     01/01/24  0953   PROCALCITONI <0.05     Glucose  Recent Labs     01/01/24  0953 01/01/24  1805   GLUC 140 213*                   GHASSAN Covarrubias

## 2024-01-02 NOTE — PLAN OF CARE
Problem: Potential for Falls  Goal: Patient will remain free of falls  Description: INTERVENTIONS:  - Educate patient/family on patient safety including physical limitations  - Instruct patient to call for assistance with activity   - Consult OT/PT to assist with strengthening/mobility   - Keep Call bell within reach  - Keep bed low and locked with side rails adjusted as appropriate  - Keep care items and personal belongings within reach  - Initiate and maintain comfort rounds  - Make Fall Risk Sign visible to staff  - Apply yellow socks and bracelet for high fall risk patients  - Consider moving patient to room near nurses station  Outcome: Progressing     Problem: PAIN - ADULT  Goal: Verbalizes/displays adequate comfort level or baseline comfort level  Description: Interventions:  - Encourage patient to monitor pain and request assistance  - Assess pain using appropriate pain scale  - Administer analgesics based on type and severity of pain and evaluate response  - Implement non-pharmacological measures as appropriate and evaluate response  - Consider cultural and social influences on pain and pain management  - Notify physician/advanced practitioner if interventions unsuccessful or patient reports new pain  Outcome: Progressing     Problem: INFECTION - ADULT  Goal: Absence or prevention of progression during hospitalization  Description: INTERVENTIONS:  - Assess and monitor for signs and symptoms of infection  - Monitor lab/diagnostic results  - Monitor all insertion sites, i.e. indwelling lines, tubes, and drains  - Monitor endotracheal if appropriate and nasal secretions for changes in amount and color  - [place] appropriate cooling/warming therapies per order  - Administer medications as ordered  - Instruct and encourage patient and family to use good hand hygiene technique  - Identify and instruct in appropriate isolation precautions for identified infection/condition  Outcome: Progressing     Problem:  SAFETY ADULT  Goal: Patient will remain free of falls  Description: INTERVENTIONS:  - Educate patient/family on patient safety including physical limitations  - Instruct patient to call for assistance with activity   - Consult OT/PT to assist with strengthening/mobility   - Keep Call bell within reach  - Keep bed low and locked with side rails adjusted as appropriate  - Keep care items and personal belongings within reach  - Initiate and maintain comfort rounds  - Make Fall Risk Sign visible to staff  - Apply yellow socks and bracelet for high fall risk patients  - Consider moving patient to room near nurses station  Outcome: Progressing  Goal: Maintain or return to baseline ADL function  Description: INTERVENTIONS:  -  Assess patient's ability to carry out ADLs; assess patient's baseline for ADL function and identify physical deficits which impact ability to perform ADLs (bathing, care of mouth/teeth, toileting, grooming, dressing, etc.)  - Assess/evaluate cause of self-care deficits   - Assess range of motion  - Assess patient's mobility; develop plan if impaired  - Assess patient's need for assistive devices and provide as appropriate  - Encourage maximum independence but intervene and supervise when necessary  - Involve family in performance of ADLs  - Assess for home care needs following discharge   - Consider OT consult to assist with ADL evaluation and planning for discharge  - Provide patient education as appropriate  Outcome: Progressing  Goal: Maintains/Returns to pre admission functional level  Description: INTERVENTIONS:  - Perform AM-PAC 6 Click Basic Mobility/ Daily Activity assessment daily.  - Set and communicate daily mobility goal to care team and patient/family/caregiver.   - Collaborate with rehabilitation services on mobility goals if consulted  - Out of bed for toileting  - Record patient progress and toleration of activity level   Outcome: Progressing     Problem: DISCHARGE PLANNING  Goal:  Discharge to home or other facility with appropriate resources  Description: INTERVENTIONS:  - Identify barriers to discharge w/patient and caregiver  - Arrange for needed discharge resources and transportation as appropriate  - Identify discharge learning needs (meds, wound care, etc.)  - Arrange for interpretive services to assist at discharge as needed  - Refer to Case Management Department for coordinating discharge planning if the patient needs post-hospital services based on physician/advanced practitioner order or complex needs related to functional status, cognitive ability, or social support system  Outcome: Progressing     Problem: Knowledge Deficit  Goal: Patient/family/caregiver demonstrates understanding of disease process, treatment plan, medications, and discharge instructions  Description: Complete learning assessment and assess knowledge base.  Interventions:  - Provide teaching at level of understanding  - Provide teaching via preferred learning methods  Outcome: Progressing     Problem: RESPIRATORY - ADULT  Goal: Achieves optimal ventilation and oxygenation  Description: INTERVENTIONS:  - Assess for changes in respiratory status  - Assess for changes in mentation and behavior  - Position to facilitate oxygenation and minimize respiratory effort  - Oxygen administered by appropriate delivery if ordered  - Initiate smoking cessation education as indicated  - Encourage broncho-pulmonary hygiene including cough, deep breathe, Incentive Spirometry  - Assess the need for suctioning and aspirate as needed  - Assess and instruct to report SOB or any respiratory difficulty  - Respiratory Therapy support as indicated  Outcome: Progressing     Problem: Prexisting or High Potential for Compromised Skin Integrity  Goal: Skin integrity is maintained or improved  Description: INTERVENTIONS:  - Identify patients at risk for skin breakdown  - Assess and monitor skin integrity  - Assess and monitor nutrition and  hydration status  - Monitor labs   - Assess for incontinence   - Turn and reposition patient  - Assist with mobility/ambulation  - Relieve pressure over bony prominences  - Avoid friction and shearing  - Provide appropriate hygiene as needed including keeping skin clean and dry  - Evaluate need for skin moisturizer/barrier cream  - Collaborate with interdisciplinary team   - Patient/family teaching  - Consider wound care consult   Outcome: Progressing

## 2024-01-02 NOTE — ASSESSMENT & PLAN NOTE
Secondary to acute asthma exacerbation   Patient recently has pneumonia in October and has had persistent cough since original diagnosis  Reports history of using rescue inhaler during sports activities as a child and teenager.  However, has not used her rescue inhaler since college  Patient also states that she has an allergy to cats and has been in the presence of cats the last two days  Etiology viral infectious vs residual pneumonia vs hypersensitivity reaction to cat dander   CT imaging negative for PE.  There does not appear to be active infiltrate noted on CT scan   Continues to have a leukocytosis.  This could represent stress response in the setting of asthma exacerbation. However, cannot completely rule out viral infectious process  COVID/RSV/influenza negative   Will check RP2 panel-pending  Check MRSA-pending  Continue with Azithromycin for abx coverage  Continue steroids

## 2024-01-02 NOTE — UTILIZATION REVIEW
Initial Clinical Review    Admission: Date/Time/Statement:   Admission Orders (From admission, onward)       Ordered        01/01/24 1323  Inpatient Admission  Once                          Orders Placed This Encounter   Procedures    Inpatient Admission     Standing Status:   Standing     Number of Occurrences:   1     Order Specific Question:   Level of Care     Answer:   Level 1 Stepdown [13]     Order Specific Question:   Estimated length of stay     Answer:   More than 2 Midnights     Order Specific Question:   Certification     Answer:   I certify that inpatient services are medically necessary for this patient for a duration of greater than two midnights. See H&P and MD Progress Notes for additional information about the patient's course of treatment.     ED Arrival Information       Expected   -    Arrival   1/1/2024 09:24    Acuity   Emergent              Means of arrival   Wheelchair    Escorted by   Friend    Service   Critical Care/ICU    Admission type   Emergency              Arrival complaint   SOB             Chief Complaint   Patient presents with    Shortness of Breath     Pt c/o sob x 2 days       Initial Presentation: 28 y.o. female with PMHx of anxiety, depression, childhood asthma and recent pneumonia on 10/2023 presents to ED from home with worsening shortness of breath.  She reports ongoing cough since pneumonia that had worsened over past 48H with SOB starting yesterday evening.  In ED on exam she is in moderate distress, anxious, tearful, + diffuse wheezing,  +conversational dyspnea. +use of accessory muscles. Tachycardic, tachypneic, O2 sat 87% on RA, 3L O2 applied. Nebs, IV steroids, IV abx given in ED. CXR neg. CTA chest PE study, neg for PE, + R T12 fx, age indeterminate. WBC 16.50. Influenza, RSV, and COVID negative.  EKG: NSR.    Admitted Inpatient to Critical Care with Acute respiratory failure with hypoxia and hypercarbia, Asthma, in acute exacerbation, Severe sepsis, Lactic  acidosis  Check RP2 panel, procal. Continue azithromycin. Bipap for wob, repeat blood gas. Solumedrol 60 IV q8h. Terbutaline. IV mag for Mag>3. Check TTE. Trend lactic, although suspect may be r/t increased wob with hypoxia vs type B in setting of albuterol nebs. Precedex. SCDs.    Date: 1/2/24   Day 2: Greatly improved overnight.  Weaned off BiPAP and NC O2.  May consider d/c if remains stable.  Remains tachycardic, trace b/l edema. Respiratory distress, wheezing present. Continue IV steroids, IV abx, nebs. SCDs. Supportive care.      ED Triage Vitals   Temperature Pulse Respirations Blood Pressure SpO2   01/01/24 0931 01/01/24 0931 01/01/24 0931 01/01/24 1015 01/01/24 0931   98.5 °F (36.9 °C) (!) 137 (!) 26 144/84 (!) 87 %      Temp Source Heart Rate Source Patient Position - Orthostatic VS BP Location FiO2 (%)   01/01/24 0931 01/01/24 0931 01/01/24 0931 01/01/24 0931 --   Temporal Monitor Sitting Left arm       Pain Score       01/01/24 1400       No Pain          Wt Readings from Last 1 Encounters:   01/02/24 104 kg (230 lb)     Additional Vital Signs:   Date/Time Temp Pulse Resp BP MAP (mmHg) SpO2 Calculated FIO2 (%) - Nasal Cannula O2 Flow Rate (L/min) Nasal Cannula O2 Flow Rate (L/min) O2 Device O2 Interface Device Patient Position - Orthostatic VS   01/02/24 1131 97.2 °F (36.2 °C) Abnormal  120 Abnormal  34 Abnormal  121/60 81 94 % -- -- -- None (Room air) -- Lying   01/02/24 1122 -- -- -- -- -- 93 % -- -- -- -- -- --   01/02/24 0816 -- 123 Abnormal  26 Abnormal  115/68 83 91 % -- -- -- -- -- --   01/02/24 0729 -- -- -- -- -- 94 % -- -- -- None (Room air) -- --   01/02/24 0500 -- 112 Abnormal  26 Abnormal  -- -- 91 % -- -- -- None (Room air) -- --   01/02/24 0409 -- -- -- -- -- 92 % -- -- -- None (Room air)   -- --   O2 Device: patient taken off oxygen at this timr per GHASSAN request at 01/02/24 0409 01/02/24 0226 -- -- -- -- -- 98 % 32 -- 3 L/min Nasal cannula -- --   01/02/24 0225 -- -- -- -- -- 98 % -- --  -- -- -- --   01/02/24 0000 98.2 °F (36.8 °C) 107 Abnormal  26 Abnormal  134/78 100 94 % 32 -- 3 L/min Nasal cannula -- --   01/01/24 2114 -- -- -- -- -- 97 % 36 -- 4 L/min Nasal cannula -- --   01/01/24 2102 -- 123 Abnormal  24 Abnormal  -- -- 97 % -- -- -- -- -- --   01/01/24 2100 -- -- -- -- -- 98 % 36 -- 4 L/min Nasal cannula -- --   01/01/24 2058 -- -- -- -- -- 98 % -- -- -- -- -- --   01/01/24 1900 -- 119 Abnormal  30 Abnormal  90/55 70 96 % 36 -- 4 L/min Nasal cannula -- --   01/01/24 1800 98.1 °F (36.7 °C) 122 Abnormal  28 Abnormal  101/56 75 95 % -- -- -- Nasal cannula -- Lying   01/01/24 1729 -- -- -- -- -- 98 % 36 -- 4 L/min Nasal cannula -- --   01/01/24 1700 -- 124 Abnormal  30 Abnormal  -- -- 98 % -- -- -- -- -- --   01/01/24 1549 -- -- -- -- -- 97 % -- -- -- -- Face mask --   01/01/24 1530 -- 130 Abnormal  26 Abnormal  130/57 82 97 % -- -- -- BiPAP -- Lying   01/01/24 1500 98.3 °F (36.8 °C) 132 Abnormal  29 Abnormal  143/65 93 98 % -- -- -- BiPAP -- Lying   01/01/24 1445 -- 138 Abnormal  28 Abnormal  143/63 91 98 % -- -- -- BiPAP -- Lying   01/01/24 1430 -- 144 Abnormal  28 Abnormal  147/63 91 98 % -- -- -- -- -- Lying   01/01/24 1415 -- 145 Abnormal  28 Abnormal  156/67 97 98 % -- -- -- Mid flow nasal cannula Face mask Lying   01/01/24 1400 98 °F (36.7 °C) 143 Abnormal  30 Abnormal  150/67 96 99 % -- 15 L/min -- Mid flow nasal cannula -- Lying   01/01/24 1330 -- 144 Abnormal  32 Abnormal  144/82 107 98 % -- -- -- -- -- --   01/01/24 1315 -- 145 Abnormal  33 Abnormal  140/89 107 97 % -- 8 L/min -- Aerosol mask -- --   01/01/24 1300 -- 145 Abnormal  31 Abnormal  125/67 89 96 % -- 8 L/min -- -- -- --   01/01/24 1245 -- 143 Abnormal  30 Abnormal  146/71 101 95 % -- 8 L/min -- -- -- --   01/01/24 1230 -- 146 Abnormal  34 Abnormal  182/87 Abnormal  125 94 % -- 8 L/min -- Aerosol mask -- --   01/01/24 1215 -- 155 Abnormal  35 Abnormal  182/111 Abnormal  141 93 % -- 8 L/min -- Mid flow nasal cannula -- --    01/01/24 1200 -- 137 Abnormal  33 Abnormal  143/96 113 95 % -- 8 L/min -- Mid flow nasal cannula -- --   01/01/24 1123 -- -- -- -- -- 94 % -- 8 L/min -- Mid flow nasal cannula -- --   01/01/24 1100 -- 137 Abnormal  31 Abnormal  146/71 101 90 % 36 -- 4 L/min Nasal cannula -- --   01/01/24 1045 -- 134 Abnormal  29 Abnormal  150/79 104 91 % -- -- -- -- -- --   01/01/24 1030 -- 126 Abnormal  28 Abnormal  157/85 112 91 % -- -- -- Aerosol mask -- --   01/01/24 1020 -- -- -- -- -- -- -- -- -- Aerosol mask -- --   01/01/24 1015 -- 133 Abnormal  24 Abnormal  144/84 107 94 % -- -- -- Aerosol mask -- --   01/01/24 0953 -- -- -- -- -- -- 32 -- 3 L/min Nasal cannula -- --   01/01/24 0931 98.5 °F (36.9 °C) 137 Abnormal  26 Abnormal  -- -- 87 % Abnormal  -- -- -- None (Room air) -- Sitting     Pertinent Labs/Diagnostic Test Results:   EKG 1/1/24:  Sinus tachycardia  Right atrial enlargement  Rightward axis  No previous ECGs available    CTA ED chest PE Study   Final Result by Mary Conley MD (01/01 1221)      No PE or acute pulmonary pathology.      Right T12 rib fracture. Indeterminate age. Correlate clinically for recent trauma and point tenderness.         XR chest 1 view portable   Final Result by Mary Conley MD (01/01 1222)   No acute cardiopulmonary findings.        Results from last 7 days   Lab Units 01/01/24  1525 01/01/24  1010   SARS-COV-2  Not Detected Negative     Results from last 7 days   Lab Units 01/02/24  0427 01/01/24  0953   WBC Thousand/uL 15.79* 16.50*   HEMOGLOBIN g/dL 10.9* 14.0   HEMATOCRIT % 32.2* 41.6   PLATELETS Thousands/uL 227 320   NEUTROS ABS Thousands/µL 14.20* 13.44*     Results from last 7 days   Lab Units 01/02/24  0427 01/01/24  1805 01/01/24  0953   SODIUM mmol/L 137 137 139   POTASSIUM mmol/L 3.8 3.8 3.9   CHLORIDE mmol/L 110* 106 103   CO2 mmol/L 20* 14* 26   ANION GAP mmol/L 7 17 10   BUN mg/dL 7 9 10   CREATININE mg/dL 0.54* 0.81 0.70   EGFR ml/min/1.73sq m 127 97 116   CALCIUM  mg/dL 8.1* 8.2* 9.3   CALCIUM, IONIZED mmol/L 1.12  --   --    MAGNESIUM mg/dL 2.3 2.6  --    PHOSPHORUS mg/dL 2.1*  --   --      Results from last 7 days   Lab Units 01/02/24  0427 01/01/24  0953   AST U/L 16 20   ALT U/L 15 21   ALK PHOS U/L 45 60   TOTAL PROTEIN g/dL 6.2* 7.9   ALBUMIN g/dL 3.8 4.5   TOTAL BILIRUBIN mg/dL 0.35 0.32     Results from last 7 days   Lab Units 01/02/24  1129   POC GLUCOSE mg/dl 141*     Results from last 7 days   Lab Units 01/02/24  0427 01/01/24  1805 01/01/24  0953   GLUCOSE RANDOM mg/dL 156* 213* 140     Results from last 7 days   Lab Units 01/02/24  0427   HEMOGLOBIN A1C % 5.5   EAG mg/dl 111     Results from last 7 days   Lab Units 01/01/24  1547   PH ART  7.258*   PCO2 ART mm Hg 31.2*   PO2 ART mm Hg 133.7*   HCO3 ART mmol/L 13.6*   BASE EXC ART mmol/L -12.2   O2 CONTENT ART mL/dL 19.1   O2 HGB, ARTERIAL % 97.2*   ABG SOURCE  Radial, Left     Results from last 7 days   Lab Units 01/01/24  0953   PH DAYNA  7.280*   PCO2 DAYNA mm Hg 52.7*   PO2 DAYNA mm Hg 46.2*   HCO3 DAYNA mmol/L 24.2   BASE EXC DAYNA mmol/L -3.2   O2 CONTENT DAYNA ml/dL 16.1   O2 HGB, VENOUS % 77.2     Results from last 7 days   Lab Units 01/01/24  0953   PROTIME seconds 13.2   INR  0.94   PTT seconds 28       Results from last 7 days   Lab Units 01/02/24  0427 01/01/24  0953   PROCALCITONIN ng/ml 0.05 <0.05     Results from last 7 days   Lab Units 01/01/24  2356 01/01/24  2141 01/01/24  1805 01/01/24  1557 01/01/24  1319 01/01/24  0953   LACTIC ACID mmol/L 2.5* 5.3* 9.3* 9.5* 6.1* 2.7*     Results from last 7 days   Lab Units 01/01/24 2045   CLARITY UA  Clear   COLOR UA  Colorless   SPEC GRAV UA  1.026   PH UA  5.0   GLUCOSE UA mg/dl >=1000 (1%)*   KETONES UA mg/dl 40 (2+)*   BLOOD UA  Negative   PROTEIN UA mg/dl Trace*   NITRITE UA  Negative   BILIRUBIN UA  Negative   UROBILINOGEN UA (BE) mg/dl <2.0   LEUKOCYTES UA  Elevated glucose may cause decreased leukocyte values. See urine microscopic for UWBC result*   WBC UA  /hpf None Seen   RBC UA /hpf None Seen   BACTERIA UA /hpf Occasional   EPITHELIAL CELLS WET PREP /hpf Occasional     Results from last 7 days   Lab Units 01/01/24  1525 01/01/24  1010   INFLUENZA A PCR   --  Negative   INFLUENZA B PCR   --  Negative   INFLUENZA B  Not Detected  --    RSV PCR   --  Negative   RESPIRATORY SYNCYTIAL VIRUS  Not Detected  --      Results from last 7 days   Lab Units 01/01/24  1525   ADENOVIRUS  Not Detected   BORDETELLA PARAPERTUSSIS  Not Detected   BORDETELLA PERTUSSIS  Not Detected   CHLAMYDIA PNEUMONIAE  Not Detected   CORONAVIRUS 229E  Not Detected   CORONAVIRUS HKU1  Not Detected   CORONAVIRUS NL63  Not Detected   CORONAVIRUS OC43  Not Detected   METAPNEUMOVIRUS  Not Detected   RHINOVIRUS  Not Detected   MYCOPLASMA PNEUMONIAE  Not Detected   PARAINFLUENZA 1  Not Detected   PARAINFLUENZA 2  Not Detected   PARAINFLUENZA 3  Not Detected   PARAINFLUENZA 4  Not Detected     Results from last 7 days   Lab Units 01/01/24  1009 01/01/24  0953   BLOOD CULTURE  Received in Microbiology Lab. Culture in Progress. Received in Microbiology Lab. Culture in Progress.         ED Treatment:   Medication Administration from 01/01/2024 0924 to 01/01/2024 1344         Date/Time Order Dose Route Action     01/01/2024 0949 EST magnesium sulfate 2 g/50 mL IVPB (premix) 2 g 2 g Intravenous New Bag     01/01/2024 0949 EST methylPREDNISolone sodium succinate (Solu-MEDROL) injection 125 mg 125 mg Intravenous Given     01/01/2024 0953 EST albuterol inhalation solution 10 mg 10 mg Nebulization Given     01/01/2024 0953 EST ipratropium (ATROVENT) 0.02 % inhalation solution 1 mg 1 mg Nebulization Given     01/01/2024 0953 EST sodium chloride 0.9 % inhalation solution 12 mL 12 mL Nebulization Given     01/01/2024 1015 EST LORazepam (ATIVAN) injection 0.5 mg 0.5 mg Intravenous Given     01/01/2024 1109 EST sodium chloride 0.9 % bolus 1,000 mL 1,000 mL Intravenous New Bag     01/01/2024 1111 EST levofloxacin  (LEVAQUIN) IVPB (premix in dextrose) 750 mg 150 mL 750 mg Intravenous New Bag     01/01/2024 1225 EST albuterol inhalation solution 5 mg 5 mg Nebulization Given     01/01/2024 1225 EST ipratropium (ATROVENT) 0.02 % inhalation solution 0.5 mg 0.5 mg Nebulization Given     01/01/2024 1233 EST LORazepam (ATIVAN) injection 1 mg 1 mg Intravenous Given     Past Medical History:   Diagnosis Date    Asthma      Present on Admission:   Respiratory failure with hypoxia and hypercapnia (HCC)   Severe asthma with exacerbation   Severe sepsis (HCC)   Lactic acidosis   Anxiety and depression   Fracture of one rib of right side      Admitting Diagnosis: SOB (shortness of breath) [R06.02]  Asthma exacerbation [J45.901]  Age/Sex: 28 y.o. female  Admission Orders:  Scheduled Medications:  azithromycin, 500 mg, Intravenous, Q24H  buPROPion, 150 mg, Oral, Daily  chlorhexidine, 15 mL, Mouth/Throat, Q12H JODY  heparin (porcine), 7,500 Units, Subcutaneous, Q8H JODY  ipratropium, 0.5 mg, Nebulization, TID  levalbuterol, 1.25 mg, Nebulization, TID  lidocaine, 1 patch, Topical, Daily  melatonin, 6 mg, Oral, HS  methylPREDNISolone sodium succinate, 40 mg, Intravenous, Q8H JODY  potassium-sodium phosphates, 2 packet, Oral, BID With Meals      dexmedeTOMIDine (Precedex) 400 mcg in sodium chloride 0.9% 100 mL  Rate: 2.6-18.2 mL/hr Dose: 0.1-0.7 mcg/kg/hr  Weight Dosing Info: 104 kg  Freq: Titrated Route: IV  Last Dose: Stopped (01/01/24 2232)  Start: 01/01/24 1430 End: 01/01/24 2116      EPINEPHrine 5,000 mcg (STANDARD CONCENTRATION) IV in sodium chloride 0.9% 250 mL  Rate: 6 mL/hr Dose: 2 mcg/min  Freq: Titrated Route: IV  Last Dose: Stopped (01/01/24 1915)  Start: 01/01/24 1400 End: 01/01/24 1947      multi-electrolyte (PLASMALYTE-A/ISOLYTE-S PH 7.4) IV solution  Rate: 125 mL/hr Dose: 125 mL/hr  Freq: Continuous Route: IV  Indications of Use: IV Hydration  Last Dose: Stopped (01/02/24 0700)  Start: 01/01/24 1345 End: 01/02/24 0648          PRN  Meds:  acetaminophen, 650 mg, Oral, Q6H PRN  albuterol, 2.5 mg, Nebulization, Q4H PRN        IP CONSULT TO CASE MANAGEMENT  IP CONSULT TO PULMONOLOGY    Network Utilization Review Department  ATTENTION: Please call with any questions or concerns to [phone] and carefully listen to the prompts so that you are directed to the right person. All voicemails are confidential.   For Discharge needs, contact Care Management DC Support Team at [phone] opt. 2  Send all requests for admission clinical reviews, approved or denied determinations and any other requests to dedicated fax number below belonging to the campus where the patient is receiving treatment. List of dedicated fax numbers for the Facilities:  FACILITY NAME UR FAX NUMBER   ADMISSION DENIALS (Administrative/Medical Necessity) [phone]   DISCHARGE SUPPORT TEAM (NETWORK) [phone]   PARENT CHILD HEALTH (Maternity/NICU/Pediatrics) [phone]   [facility] [phone]   [facility] [phone]   [facility] [phone]   [facility] [phone]   [facility] [phone]   [facility] [phone]   [facility] [phone]   [facility] [phone]   [facility] [phone]   [facility] [phone]   [facility] [phone]

## 2024-01-02 NOTE — UTILIZATION REVIEW
NOTIFICATION OF INPATIENT ADMISSION   AUTHORIZATION REQUEST   SERVICING FACILITY:   [place] [address]  Tax ID: [id number]  NPI: [id number] ATTENDING PROVIDER:  Attending Name and NPI#: Dwayne Lomas Md [[id number]]  Address: [address] [place] [address]  Phone: [phone]     ADMISSION INFORMATION:  Place of Service: Inpatient [facility]  Place of Service Code: 21  Inpatient Admission Date/Time: 1/1/24  1:23 PM  Discharge Date/Time: No discharge date for patient encounter.  Admitting Diagnosis Code/Description:  SOB (shortness of breath) [R06.02]  Asthma exacerbation [J45.901]     UTILIZATION REVIEW CONTACT:  Carissa Rose Utilization   Network Utilization Review Department  Phone: [phone]  Fax [phone]  Email: [email or web address]  Contact for approvals/pending authorizations, clinical reviews, and discharge.     PHYSICIAN ADVISORY SERVICES:  Medical Necessity Denial & Jglp-vb-Ieyf Review  Phone: [phone]  Fax: [phone]  Email: [email or web address]     DISCHARGE SUPPORT TEAM:  For Patients Discharge Needs & Updates  Phone: [phone] opt. 2 Fax: [phone]  Email: [email or web address]

## 2024-01-02 NOTE — PLAN OF CARE
Problem: Potential for Falls  Goal: Patient will remain free of falls  Description: INTERVENTIONS:  - Educate patient/family on patient safety including physical limitations  - Instruct patient to call for assistance with activity   - Consult OT/PT to assist with strengthening/mobility   - Keep Call bell within reach  - Keep bed low and locked with side rails adjusted as appropriate  - Keep care items and personal belongings within reach  - Initiate and maintain comfort rounds  - Make Fall Risk Sign visible to staff  - Apply yellow socks and bracelet for high fall risk patients  - Consider moving patient to room near nurses station  Outcome: Progressing     Problem: PAIN - ADULT  Goal: Verbalizes/displays adequate comfort level or baseline comfort level  Description: Interventions:  - Encourage patient to monitor pain and request assistance  - Assess pain using appropriate pain scale  - Administer analgesics based on type and severity of pain and evaluate response  - Implement non-pharmacological measures as appropriate and evaluate response  - Consider cultural and social influences on pain and pain management  - Notify physician/advanced practitioner if interventions unsuccessful or patient reports new pain  Outcome: Progressing     Problem: INFECTION - ADULT  Goal: Absence or prevention of progression during hospitalization  Description: INTERVENTIONS:  - Assess and monitor for signs and symptoms of infection  - Monitor lab/diagnostic results  - Monitor all insertion sites, i.e. indwelling lines, tubes, and drains  - Monitor endotracheal if appropriate and nasal secretions for changes in amount and color  - [place] appropriate cooling/warming therapies per order  - Administer medications as ordered  - Instruct and encourage patient and family to use good hand hygiene technique  - Identify and instruct in appropriate isolation precautions for identified infection/condition  Outcome: Progressing     Problem:  SAFETY ADULT  Goal: Patient will remain free of falls  Description: INTERVENTIONS:  - Educate patient/family on patient safety including physical limitations  - Instruct patient to call for assistance with activity   - Consult OT/PT to assist with strengthening/mobility   - Keep Call bell within reach  - Keep bed low and locked with side rails adjusted as appropriate  - Keep care items and personal belongings within reach  - Initiate and maintain comfort rounds  - Make Fall Risk Sign visible to staff  - Apply yellow socks and bracelet for high fall risk patients  - Consider moving patient to room near nurses station  Outcome: Progressing  Goal: Maintain or return to baseline ADL function  Description: INTERVENTIONS:  -  Assess patient's ability to carry out ADLs; assess patient's baseline for ADL function and identify physical deficits which impact ability to perform ADLs (bathing, care of mouth/teeth, toileting, grooming, dressing, etc.)  - Assess/evaluate cause of self-care deficits   - Assess range of motion  - Assess patient's mobility; develop plan if impaired  - Assess patient's need for assistive devices and provide as appropriate  - Encourage maximum independence but intervene and supervise when necessary  - Involve family in performance of ADLs  - Assess for home care needs following discharge   - Consider OT consult to assist with ADL evaluation and planning for discharge  - Provide patient education as appropriate  Outcome: Progressing  Goal: Maintains/Returns to pre admission functional level  Description: INTERVENTIONS:  - Perform AM-PAC 6 Click Basic Mobility/ Daily Activity assessment daily.  - Set and communicate daily mobility goal to care team and patient/family/caregiver.   - Collaborate with rehabilitation services on mobility goals if consulted  - Out of bed for toileting  - Record patient progress and toleration of activity level   Outcome: Progressing     Problem: DISCHARGE PLANNING  Goal:  Discharge to home or other facility with appropriate resources  Description: INTERVENTIONS:  - Identify barriers to discharge w/patient and caregiver  - Arrange for needed discharge resources and transportation as appropriate  - Identify discharge learning needs (meds, wound care, etc.)  - Arrange for interpretive services to assist at discharge as needed  - Refer to Case Management Department for coordinating discharge planning if the patient needs post-hospital services based on physician/advanced practitioner order or complex needs related to functional status, cognitive ability, or social support system  Outcome: Progressing     Problem: Knowledge Deficit  Goal: Patient/family/caregiver demonstrates understanding of disease process, treatment plan, medications, and discharge instructions  Description: Complete learning assessment and assess knowledge base.  Interventions:  - Provide teaching at level of understanding  - Provide teaching via preferred learning methods  Outcome: Progressing     Problem: RESPIRATORY - ADULT  Goal: Achieves optimal ventilation and oxygenation  Description: INTERVENTIONS:  - Assess for changes in respiratory status  - Assess for changes in mentation and behavior  - Position to facilitate oxygenation and minimize respiratory effort  - Oxygen administered by appropriate delivery if ordered  - Initiate smoking cessation education as indicated  - Encourage broncho-pulmonary hygiene including cough, deep breathe, Incentive Spirometry  - Assess the need for suctioning and aspirate as needed  - Assess and instruct to report SOB or any respiratory difficulty  - Respiratory Therapy support as indicated  Outcome: Progressing     Problem: Prexisting or High Potential for Compromised Skin Integrity  Goal: Skin integrity is maintained or improved  Description: INTERVENTIONS:  - Identify patients at risk for skin breakdown  - Assess and monitor skin integrity  - Assess and monitor nutrition and  hydration status  - Monitor labs   - Assess for incontinence   - Turn and reposition patient  - Assist with mobility/ambulation  - Relieve pressure over bony prominences  - Avoid friction and shearing  - Provide appropriate hygiene as needed including keeping skin clean and dry  - Evaluate need for skin moisturizer/barrier cream  - Collaborate with interdisciplinary team   - Patient/family teaching  - Consider wound care consult   Outcome: Progressing

## 2024-01-03 VITALS
RESPIRATION RATE: 18 BRPM | HEIGHT: 63 IN | SYSTOLIC BLOOD PRESSURE: 119 MMHG | HEART RATE: 104 BPM | DIASTOLIC BLOOD PRESSURE: 77 MMHG | BODY MASS INDEX: 40.75 KG/M2 | TEMPERATURE: 97.7 F | WEIGHT: 230 LBS | OXYGEN SATURATION: 95 %

## 2024-01-03 LAB
ANION GAP SERPL CALCULATED.3IONS-SCNC: 8 MMOL/L
BUN SERPL-MCNC: 10 MG/DL (ref 5–25)
CALCIUM SERPL-MCNC: 8.7 MG/DL (ref 8.4–10.2)
CHLORIDE SERPL-SCNC: 106 MMOL/L (ref 96–108)
CO2 SERPL-SCNC: 23 MMOL/L (ref 21–32)
CREAT SERPL-MCNC: 0.56 MG/DL (ref 0.6–1.3)
ERYTHROCYTE [DISTWIDTH] IN BLOOD BY AUTOMATED COUNT: 13.2 % (ref 11.6–15.1)
GFR SERPL CREATININE-BSD FRML MDRD: 125 ML/MIN/1.73SQ M
GLUCOSE SERPL-MCNC: 106 MG/DL (ref 65–140)
GLUCOSE SERPL-MCNC: 132 MG/DL (ref 65–140)
GLUCOSE SERPL-MCNC: 90 MG/DL (ref 65–140)
HCT VFR BLD AUTO: 34.3 % (ref 34.8–46.1)
HGB BLD-MCNC: 11.2 G/DL (ref 11.5–15.4)
MCH RBC QN AUTO: 30.4 PG (ref 26.8–34.3)
MCHC RBC AUTO-ENTMCNC: 32.7 G/DL (ref 31.4–37.4)
MCV RBC AUTO: 93 FL (ref 82–98)
MRSA NOSE QL CULT: NORMAL
PLATELET # BLD AUTO: 244 THOUSANDS/UL (ref 149–390)
PMV BLD AUTO: 10.2 FL (ref 8.9–12.7)
POTASSIUM SERPL-SCNC: 4.1 MMOL/L (ref 3.5–5.3)
RBC # BLD AUTO: 3.68 MILLION/UL (ref 3.81–5.12)
SODIUM SERPL-SCNC: 137 MMOL/L (ref 135–147)
WBC # BLD AUTO: 15.66 THOUSAND/UL (ref 4.31–10.16)

## 2024-01-03 PROCEDURE — 94640 AIRWAY INHALATION TREATMENT: CPT

## 2024-01-03 PROCEDURE — 99232 SBSQ HOSP IP/OBS MODERATE 35: CPT | Performed by: INTERNAL MEDICINE

## 2024-01-03 PROCEDURE — 80048 BASIC METABOLIC PNL TOTAL CA: CPT

## 2024-01-03 PROCEDURE — 85027 COMPLETE CBC AUTOMATED: CPT

## 2024-01-03 PROCEDURE — 99239 HOSP IP/OBS DSCHRG MGMT >30: CPT | Performed by: NURSE PRACTITIONER

## 2024-01-03 PROCEDURE — 82948 REAGENT STRIP/BLOOD GLUCOSE: CPT

## 2024-01-03 PROCEDURE — 94760 N-INVAS EAR/PLS OXIMETRY 1: CPT

## 2024-01-03 RX ORDER — FLUTICASONE FUROATE AND VILANTEROL 200; 25 UG/1; UG/1
1 POWDER RESPIRATORY (INHALATION) DAILY
Start: 2024-01-03

## 2024-01-03 RX ORDER — PREDNISONE 20 MG/1
40 TABLET ORAL DAILY
Qty: 12 TABLET | Refills: 0 | Status: SHIPPED | OUTPATIENT
Start: 2024-01-04 | End: 2024-01-10

## 2024-01-03 RX ORDER — LORAZEPAM 0.5 MG/1
0.5 TABLET ORAL EVERY 8 HOURS PRN
Status: DISCONTINUED | OUTPATIENT
Start: 2024-01-03 | End: 2024-01-03 | Stop reason: HOSPADM

## 2024-01-03 RX ORDER — FLUTICASONE FUROATE AND VILANTEROL 200; 25 UG/1; UG/1
1 POWDER RESPIRATORY (INHALATION) DAILY
Status: DISCONTINUED | OUTPATIENT
Start: 2024-01-03 | End: 2024-01-03 | Stop reason: HOSPADM

## 2024-01-03 RX ORDER — CALCIUM CARBONATE 500 MG/1
500 TABLET, CHEWABLE ORAL DAILY PRN
Status: DISCONTINUED | OUTPATIENT
Start: 2024-01-03 | End: 2024-01-03 | Stop reason: HOSPADM

## 2024-01-03 RX ORDER — ALBUTEROL SULFATE 2.5 MG/3ML
2.5 SOLUTION RESPIRATORY (INHALATION) EVERY 4 HOURS PRN
Qty: 30 ML | Refills: 0 | Status: SHIPPED | OUTPATIENT
Start: 2024-01-03 | End: 2024-02-02

## 2024-01-03 RX ORDER — LEVALBUTEROL INHALATION SOLUTION 1.25 MG/3ML
1.25 SOLUTION RESPIRATORY (INHALATION)
Qty: 270 ML | Refills: 0 | Status: SHIPPED | OUTPATIENT
Start: 2024-01-03 | End: 2024-02-02

## 2024-01-03 RX ADMIN — GUAIFENESIN AND DEXTROMETHORPHAN 10 ML: 100; 10 SYRUP ORAL at 07:54

## 2024-01-03 RX ADMIN — FLUTICASONE FUROATE AND VILANTEROL TRIFENATATE 1 PUFF: 200; 25 POWDER RESPIRATORY (INHALATION) at 10:34

## 2024-01-03 RX ADMIN — IPRATROPIUM BROMIDE 0.5 MG: 0.5 SOLUTION RESPIRATORY (INHALATION) at 07:38

## 2024-01-03 RX ADMIN — LORAZEPAM 0.5 MG: 0.5 TABLET ORAL at 09:25

## 2024-01-03 RX ADMIN — CHLORHEXIDINE GLUCONATE 0.12% ORAL RINSE 15 ML: 1.2 LIQUID ORAL at 08:46

## 2024-01-03 RX ADMIN — LEVALBUTEROL HYDROCHLORIDE 1.25 MG: 1.25 SOLUTION RESPIRATORY (INHALATION) at 07:38

## 2024-01-03 RX ADMIN — CALCIUM CARBONATE (ANTACID) CHEW TAB 500 MG 500 MG: 500 CHEW TAB at 05:30

## 2024-01-03 RX ADMIN — BUPROPION HYDROCHLORIDE 150 MG: 150 TABLET, EXTENDED RELEASE ORAL at 08:47

## 2024-01-03 RX ADMIN — PREDNISONE 40 MG: 20 TABLET ORAL at 08:47

## 2024-01-03 RX ADMIN — HEPARIN SODIUM 7500 UNITS: 5000 INJECTION INTRAVENOUS; SUBCUTANEOUS at 05:30

## 2024-01-03 NOTE — PLAN OF CARE
Problem: PAIN - ADULT  Goal: Verbalizes/displays adequate comfort level or baseline comfort level  Description: Interventions:  - Encourage patient to monitor pain and request assistance  - Assess pain using appropriate pain scale  - Administer analgesics based on type and severity of pain and evaluate response  - Implement non-pharmacological measures as appropriate and evaluate response  - Consider cultural and social influences on pain and pain management  - Notify physician/advanced practitioner if interventions unsuccessful or patient reports new pain  Outcome: Progressing     Problem: INFECTION - ADULT  Goal: Absence or prevention of progression during hospitalization  Description: INTERVENTIONS:  - Assess and monitor for signs and symptoms of infection  - Monitor lab/diagnostic results  - Monitor all insertion sites, i.e. indwelling lines, tubes, and drains  - Monitor endotracheal if appropriate and nasal secretions for changes in amount and color  - [place] appropriate cooling/warming therapies per order  - Administer medications as ordered  - Instruct and encourage patient and family to use good hand hygiene technique  - Identify and instruct in appropriate isolation precautions for identified infection/condition  Outcome: Progressing

## 2024-01-03 NOTE — PLAN OF CARE
Problem: Potential for Falls  Goal: Patient will remain free of falls  Description: INTERVENTIONS:  - Educate patient/family on patient safety including physical limitations  - Instruct patient to call for assistance with activity   - Consult OT/PT to assist with strengthening/mobility   - Keep Call bell within reach  - Keep bed low and locked with side rails adjusted as appropriate  - Keep care items and personal belongings within reach  - Initiate and maintain comfort rounds  - Make Fall Risk Sign visible to staff  - Apply yellow socks and bracelet for high fall risk patients  - Consider moving patient to room near nurses station  Outcome: Progressing     Problem: PAIN - ADULT  Goal: Verbalizes/displays adequate comfort level or baseline comfort level  Description: Interventions:  - Encourage patient to monitor pain and request assistance  - Assess pain using appropriate pain scale  - Administer analgesics based on type and severity of pain and evaluate response  - Implement non-pharmacological measures as appropriate and evaluate response  - Consider cultural and social influences on pain and pain management  - Notify physician/advanced practitioner if interventions unsuccessful or patient reports new pain  Outcome: Progressing     Problem: INFECTION - ADULT  Goal: Absence or prevention of progression during hospitalization  Description: INTERVENTIONS:  - Assess and monitor for signs and symptoms of infection  - Monitor lab/diagnostic results  - Monitor all insertion sites, i.e. indwelling lines, tubes, and drains  - Monitor endotracheal if appropriate and nasal secretions for changes in amount and color  - [place] appropriate cooling/warming therapies per order  - Administer medications as ordered  - Instruct and encourage patient and family to use good hand hygiene technique  - Identify and instruct in appropriate isolation precautions for identified infection/condition  Outcome: Progressing     Problem:  SAFETY ADULT  Goal: Patient will remain free of falls  Description: INTERVENTIONS:  - Educate patient/family on patient safety including physical limitations  - Instruct patient to call for assistance with activity   - Consult OT/PT to assist with strengthening/mobility   - Keep Call bell within reach  - Keep bed low and locked with side rails adjusted as appropriate  - Keep care items and personal belongings within reach  - Initiate and maintain comfort rounds  - Apply yellow socks and bracelet for high fall risk patients  - Consider moving patient to room near nurses station  Outcome: Progressing  Goal: Maintain or return to baseline ADL function  Description: INTERVENTIONS:  -  Assess patient's ability to carry out ADLs; assess patient's baseline for ADL function and identify physical deficits which impact ability to perform ADLs (bathing, care of mouth/teeth, toileting, grooming, dressing, etc.)  - Assess/evaluate cause of self-care deficits   - Assess range of motion  - Assess patient's mobility; develop plan if impaired  - Assess patient's need for assistive devices and provide as appropriate  - Encourage maximum independence but intervene and supervise when necessary  - Involve family in performance of ADLs  - Assess for home care needs following discharge   - Consider OT consult to assist with ADL evaluation and planning for discharge  - Provide patient education as appropriate  Outcome: Progressing  Goal: Maintains/Returns to pre admission functional level  Description: INTERVENTIONS:  - Perform AM-PAC 6 Click Basic Mobility/ Daily Activity assessment daily.  - Set and communicate daily mobility goal to care team and patient/family/caregiver.   - Collaborate with rehabilitation services on mobility goals if consulted  - Out of bed for toileting  - Record patient progress and toleration of activity level   Outcome: Progressing     Problem: DISCHARGE PLANNING  Goal: Discharge to home or other facility with  appropriate resources  Description: INTERVENTIONS:  - Identify barriers to discharge w/patient and caregiver  - Arrange for needed discharge resources and transportation as appropriate  - Identify discharge learning needs (meds, wound care, etc.)  - Arrange for interpretive services to assist at discharge as needed  - Refer to Case Management Department for coordinating discharge planning if the patient needs post-hospital services based on physician/advanced practitioner order or complex needs related to functional status, cognitive ability, or social support system  Outcome: Progressing     Problem: Knowledge Deficit  Goal: Patient/family/caregiver demonstrates understanding of disease process, treatment plan, medications, and discharge instructions  Description: Complete learning assessment and assess knowledge base.  Interventions:  - Provide teaching at level of understanding  - Provide teaching via preferred learning methods  Outcome: Progressing     Problem: RESPIRATORY - ADULT  Goal: Achieves optimal ventilation and oxygenation  Description: INTERVENTIONS:  - Assess for changes in respiratory status  - Assess for changes in mentation and behavior  - Position to facilitate oxygenation and minimize respiratory effort  - Oxygen administered by appropriate delivery if ordered  - Initiate smoking cessation education as indicated  - Encourage broncho-pulmonary hygiene including cough, deep breathe, Incentive Spirometry  - Assess the need for suctioning and aspirate as needed  - Assess and instruct to report SOB or any respiratory difficulty  - Respiratory Therapy support as indicated  Outcome: Progressing     Problem: Prexisting or High Potential for Compromised Skin Integrity  Goal: Skin integrity is maintained or improved  Description: INTERVENTIONS:  - Identify patients at risk for skin breakdown  - Assess and monitor skin integrity  - Assess and monitor nutrition and hydration status  - Monitor labs   - Assess  for incontinence   - Turn and reposition patient  - Assist with mobility/ambulation  - Relieve pressure over bony prominences  - Avoid friction and shearing  - Provide appropriate hygiene as needed including keeping skin clean and dry  - Evaluate need for skin moisturizer/barrier cream  - Collaborate with interdisciplinary team   - Patient/family teaching  - Consider wound care consult   Outcome: Progressing

## 2024-01-03 NOTE — DISCHARGE SUMMARY
[facility]  Discharge- Domenica De Los Santos 1995, 28 y.o. female MRN: [id number]  Unit/Bed#: -01 Encounter: [id number]  Primary Care Provider: No primary care provider on file.   Date and time admitted to hospital: 1/1/2024  9:32 AM    * Respiratory failure with hypoxia and hypercapnia (HCC)  Assessment & Plan  Patient presented to the ED with complaints of worsening shortness of breath with an ongoing, worsening cough that she originally developed after being treated for pneumonia in October.  Initially, patient required BiPAP and was admitted to the ICU.  Now on RA.  Acute respiratory failure in setting of asthma exacerbation. Which was possibly due to being around cats recently.  CTA Chest (1/1/24): No PE.  No acute pulmonary abnormality.  Flu/RSV/Covid and RP2 panels are all negative. Procalcitonin normal.  Pulmonology consult, appreciate input  Received IV Solu-Medrol since admission, will be transitioned to Prednisone 40 mg daily x 10 days and then stop.  Continue Xopenex/Atrovent  Recommending ICS/LABA on discharge  Nebulizer machine and medications on discharge    Anxiety and depression  Assessment & Plan  Patient takes Wellbutrin at home, continue here.  She notes increased anxiety as well.  PDMP reviewed, no consistent history of benzodiazapine use.  Patient received PO Ativan last night at bedtime with improvement to symptoms  Continue with 0.5 mg Ativan every 8 hours as needed for anxiety.    Lactic acidosis  Assessment & Plan  Present on admission, evidenced by a lactic acid level of 2.7  Peaking at 9.3 and then finally trending down to 2.5  Patient without signs of active infection or hemodynamic instability.  Lactic acidosis appears to be related to albuterol treatments.  No further indication to check lactic acid levels.    Severe sepsis (HCC)  Assessment & Plan  Initially patient was thought to be septic on admission  She met SIRS criteria, evidenced by tachycardia,  tachypnea and leukocytosis with a lactic acid level of 2.7  Viral infection work-up negative.  No concern for bacterial infection.  Symptoms related to asthma exacerbation.  No further indication for abx at this time.  Sepsis ruled out.    Severe asthma with exacerbation  Assessment & Plan  Childhood diagnosed, typically well controlled.  Has not had to use inhalers for years  Over the last year, she endorses 3 separate occasions she had to use 3 prednisone courses for flares.  Follow-up with Pulm OP      Medical Problems       Resolved Problems  Date Reviewed: 1/3/2024   None       Discharging Physician / Practitioner: GHASSAN Luo  PCP: No primary care provider on file.  Admission Date:   Admission Orders (From admission, onward)       Ordered        01/01/24 1323  Inpatient Admission  Once                          Discharge Date: 01/03/24    Consultations During Hospital Stay:  IP CONSULT TO CASE MANAGEMENT  IP CONSULT TO PULMONOLOGY    Procedures Performed:   None    Significant Findings / Test Results:   CTA ED chest PE Study   Final Result by Mary Conley MD (01/01 1221)      No PE or acute pulmonary pathology.      Right T12 rib fracture. Indeterminate age. Correlate clinically for recent trauma and point tenderness.                  Workstation performed: [id number]         XR chest 1 view portable   Final Result by Mary Conley MD (01/01 1222)   No acute cardiopulmonary findings.                  Workstation performed: [id number]             Incidental Findings:   None     Test Results Pending at Discharge (will require follow up):   None     Outpatient Tests Requested:  Follow up with PCP within 1 wk    Complications:  None    Reason for Admission: Acute respiratory failure with hypoxia    Hospital Course:   Domenica De Los Santos is a 28 y.o. female patient with past medical history of severe asthma, and anxiety and depression who originally presented to the hospital on 1/1/2024 due to worsening  "shortness of breath with an ongoing worsening cough that she developed after being treated for pneumonia in October 2023.  Patient required admission to the ICU for BiPAP and has been now weaned to room air and tolerating well.  Patient was also requiring IV Solu-Medrol and was transition to prednisone 40 mg daily which began today.  She also initially was thought to meet severe sepsis criteria, as patient was tachycardic, tachypneic and had leukocytosis on admission with a lactic acid of 2.7 however workup for viral infection as well as bacterial infection was both negative.  Therefore sepsis was ruled out.  Patient did have a persistent lactic acidosis related to albuterol treatments.  Patient was seen and evaluated by pulmonology.  Recommending an inhaled corticosteroid as well as a LABA on discharge.  She was also given a nebulizer machine with nebulizer medication sent to her pharmacy.  Patient was advised to follow-up with her primary care provider within 7 days of discharge and obtain a pulmonology referral that she can follow-up with as well, patient is not from this area and actually [place] down near [place].    Patient stable and ready for discharge.      Please see above list of diagnoses and related plan for additional information.     Condition at Discharge: good    Discharge Day Visit / Exam:   Subjective:  Patient denies any complaints of chest pain, shortness of breath or fever this morning. Reports she is ready to go home.    Vitals: Blood Pressure: 119/77 (01/03/24 0742)  Pulse: 104 (01/03/24 0751)  Temperature: 97.7 °F (36.5 °C) (01/03/24 0742)  Temp Source: Oral (01/02/24 1131)  Respirations: 18 (01/03/24 0751)  Height: 5' 3\" (160 cm) (01/02/24 0816)  Weight - Scale: 104 kg (230 lb) (01/02/24 0816)  SpO2: 95 % (01/03/24 0847)    Exam:   Physical Exam  Vitals and nursing note reviewed.   Constitutional:       General: She is not in acute distress.     Appearance: She is obese. She is not " ill-appearing.   Cardiovascular:      Rate and Rhythm: Normal rate.      Pulses: Normal pulses.      Heart sounds: Normal heart sounds.   Pulmonary:      Effort: Pulmonary effort is normal. No tachypnea, bradypnea or respiratory distress.      Breath sounds: Decreased breath sounds present.      Comments: RA; No wheezes noted on auscultation   Abdominal:      General: Bowel sounds are normal.      Palpations: Abdomen is soft.   Musculoskeletal:         General: Normal range of motion.   Skin:     General: Skin is warm and dry.   Neurological:      Mental Status: She is alert and oriented to person, place, and time.   Psychiatric:         Mood and Affect: Mood normal.          Discussion with Family: Updated  (Family) at bedside.    Discharge instructions/Information to patient and family:   See after visit summary for information provided to patient and family.      Provisions for Follow-Up Care:  See after visit summary for information related to follow-up care and any pertinent home health orders.      Mobility at time of Discharge:   Basic Mobility Inpatient Raw Score: 24  JH-HLM Goal: 8: Walk 250 feet or more  JH-HLM Achieved: 7: Walk 25 feet or more  HLM Goal achieved. Continue to encourage appropriate mobility.     Disposition:   Home    Planned Readmission: None     Discharge Statement:  I spent 35 minutes discharging the patient. This time was spent on the day of discharge. I had direct contact with the patient on the day of discharge. Greater than 50% of the total time was spent examining patient, answering all patient questions, arranging and discussing plan of care with patient as well as directly providing post-discharge instructions.  Additional time then spent on discharge activities.    Discharge Medications:  See after visit summary for reconciled discharge medications provided to patient and/or family.      **Please Note: This note may have been constructed using a voice recognition  system**

## 2024-01-03 NOTE — RESPIRATORY THERAPY NOTE
01/02/24 1939   Respiratory Protocol   Protocol Initiated? No   Protocol Selection Respiratory   Language Barrier? No   Medical & Social History Reviewed? Yes   Diagnostic Studies Reviewed? Yes   Physical Assessment Performed? Yes   Respiratory Plan Mild Distress pathway   Respiratory Assessment   General Appearance Awake;Alert   Respiratory Pattern Normal   Chest Assessment Chest expansion symmetrical   Bilateral Breath Sounds Clear;Diminished   Cough Non-productive;Dry;Strong;Barky   Resp Comments even non labored respirations   O2 Device room air   Additional Assessments   SpO2 95 %     Continue with scheduled aerosol therapy

## 2024-01-03 NOTE — ASSESSMENT & PLAN NOTE
Present on admission, evidenced by a lactic acid level of 2.7  Peaking at 9.3 and then finally trending down to 2.5  Patient without signs of active infection or hemodynamic instability.  Lactic acidosis appears to be related to albuterol treatments.  No further indication to check lactic acid levels.   Hypertriglyceridemia

## 2024-01-03 NOTE — ASSESSMENT & PLAN NOTE
Childhood diagnosed, typically well controlled.  Has not had to use inhalers for years  Over the last year, she endorses 3 separate occasions she had to use 3 prednisone courses for flares.  Follow-up with Pulm OP

## 2024-01-03 NOTE — ASSESSMENT & PLAN NOTE
Patient takes Wellbutrin at home, continue here.  She notes increased anxiety as well.  PDMP reviewed, no consistent history of benzodiazapine use.  Patient received PO Ativan last night at bedtime with improvement to symptoms  Continue with 0.5 mg Ativan every 8 hours as needed for anxiety.

## 2024-01-03 NOTE — CASE MANAGEMENT
Case Management Assessment & Discharge Planning Note    Patient name Domenica De Los Santos  Location /-01 MRN [id number]  : 1995 Date 1/3/2024       Current Admission Date: 2024  Current Admission Diagnosis:Respiratory failure with hypoxia and hypercapnia (HCC)   Patient Active Problem List    Diagnosis Date Noted    Respiratory failure with hypoxia and hypercapnia (HCC) 2024    Severe asthma with exacerbation 2024    Severe sepsis (HCC) 2024    Lactic acidosis 2024    Anxiety and depression 2024    Fracture of one rib of right side 2024      LOS (days): 2  Geometric Mean LOS (GMLOS) (days):   Days to GMLOS:     OBJECTIVE:    Risk of Unplanned Readmission Score: 10.64         Current admission status: Inpatient       Preferred Pharmacy:   [id number] - [place], [place] - [address]  [address] [place] [address]  Phone: [phone] Fax: [phone]    Primary Care Provider: No primary care provider on file.    Primary Insurance: C-[employer]arioMCIHELLE  Secondary Insurance:     ASSESSMENT:  Active Health Care Proxies    There are no active Health Care Proxies on file.                 Readmission Root Cause  30 Day Readmission: No    Patient Information  Admitted from:: Home  Mental Status: Alert  During Assessment patient was accompanied by: Parent  Assessment information provided by:: Patient  Primary Caregiver: Self  Support Systems: Self, Spouse/significant other  County of Residence: Other (specify in comment box)  What city do you live in?: [place]  Living Arrangements: Lives w/ Spouse/significant other    Activities of Daily Living Prior to Admission  Functional Status: Independent  Completes ADLs independently?: Yes  Ambulates independently?: Yes  Does patient use assisted devices?: No  Does patient currently own DME?: No  Does patient have a history of Outpatient Therapy (PT/OT)?: No  Does the patient have a history of Short-Term  Rehab?: No  Does patient have a history of HHC?: No  Does patient currently have HHC?: No         Patient Information Continued  Income Source: Employed  Does patient have prescription coverage?: Yes  Does patient receive dialysis treatments?: No  Does patient have a history of substance abuse?: No  Does patient have a history of Mental Health Diagnosis?: No    PHQ 2/9 Screening   Reviewed PHQ 2/9 Depression Screening Score?: No    Means of Transportation  Means of Transport to Appts:: Drives Self      Housing Stability: Low Risk  (1/3/2024)    Housing Stability Vital Sign     Unable to Pay for Housing in the Last Year: No     Number of Places Lived in the Last Year: 1     Unstable Housing in the Last Year: No   Food Insecurity: No Food Insecurity (1/3/2024)    Hunger Vital Sign     Worried About Running Out of Food in the Last Year: Never true     Ran Out of Food in the Last Year: Never true   Transportation Needs: No Transportation Needs (1/3/2024)    PRAPARE - Transportation     Lack of Transportation (Medical): No     Lack of Transportation (Non-Medical): No   Utilities: Not At Risk (1/3/2024)    [facility] Utilities     Threatened with loss of utilities: No       DISCHARGE DETAILS:    Discharge planning discussed with:: Patient and mother at the bedside  Freedom of Choice: Yes  Comments - Freedom of Choice: FOC maintained in discussion regarding discharge planning. Patient is alert oriented and competent to make decisions. Introduced self and role, explained role of CM in arranging services such as DME, STR, HHC, and providing community resource information. Discussed current living situation and needs at discharge. Patient is IPTA. CM offered HHC, STR, DME, PCP, OP CM, community resources. Patient describes no additional CM needs. Nebulizer delivered and demostrated for patient and mother, made aware of the $80 deductible. .  CM contacted family/caregiver?: Yes  Were Treatment Team discharge recommendations reviewed  with patient/caregiver?: Yes  Did patient/caregiver verbalize understanding of patient care needs?: Yes  Were patient/caregiver advised of the risks associated with not following Treatment Team discharge recommendations?: Yes         Requested Home Health Care         Is the patient interested in HHC at discharge?: No    DME Referral Provided  Referral made for DME?: No    Other Referral/Resources/Interventions Provided:  Interventions: DME  Referral Comments: Nebulizer delivered to patient and delivery ticket signed. CM will continue to follow for needs.    Would you like to participate in our [facility] service program?  : No - Declined    Treatment Team Recommendation: Home  Discharge Destination Plan:: Home  Transport at Discharge : Family

## 2024-01-03 NOTE — ASSESSMENT & PLAN NOTE
Initially patient was thought to be septic on admission  She met SIRS criteria, evidenced by tachycardia, tachypnea and leukocytosis with a lactic acid level of 2.7  Viral infection work-up negative.  No concern for bacterial infection.  Symptoms related to asthma exacerbation.  No further indication for abx at this time.  Sepsis ruled out.

## 2024-01-03 NOTE — PROGRESS NOTES
"Progress Note - Pulmonary   Domenica De Los Santos 28 y.o. female MRN: [id number]  Unit/Bed#: -01 Encounter: [id number]    Assessment:  Acute hypoxemic respiratory failure, improved  Asthma with acute exacerbation  Recent pneumonia    Plan:  Acute hypoxemic respiratory failure secondary to asthma exacerbation  Patient now on room air, initially required BiPAP  Transitioned to prednisone, would complete a 10-day course of 40 mg of prednisone  Would discharge her with Breo or formulary equivalent (ICS/LABA)  Will need outpatient follow-up with pulmonary, she does not live in the area and will schedule an appointment with the pulmonologist close to her home  Will need a nebulizer prior to discharge, spoke with case management  She is stable for discharge home today    Subjective:   Patient resting in bed. Continues to feel better this morning.     Objective:     Vitals: Blood pressure 119/77, pulse 104, temperature 97.7 °F (36.5 °C), resp. rate 18, height 5' 3\" (1.6 m), weight 104 kg (230 lb), SpO2 96%.,Body mass index is 40.74 kg/m².      Intake/Output Summary (Last 24 hours) at 1/3/2024 1021  Last data filed at 1/3/2024 0832  Gross per 24 hour   Intake 600 ml   Output 1100 ml   Net -500 ml       Invasive Devices       Peripheral Intravenous Line  Duration             Peripheral IV 01/01/24 Left;Ventral (anterior) Forearm 2 days    Peripheral IV 01/01/24 Right Antecubital 2 days                    Physical Exam: /77   Pulse 104   Temp 97.7 °F (36.5 °C)   Resp 18   Ht 5' 3\" (1.6 m)   Wt 104 kg (230 lb)   SpO2 96%   BMI 40.74 kg/m²   General appearance: alert and oriented, in no acute distress  Head: Normocephalic, without obvious abnormality, atraumatic  Eyes: negative findings: conjunctivae and sclerae normal  Lungs:  faint expiratory wheeze, R>L  Heart: regular rate and rhythm  Abdomen: normal findings: soft, non-tender  Extremities:  no edema  Skin:  warm and dry  Neurologic: Mental status: Alert, " oriented, thought content appropriate     Labs: I have personally reviewed pertinent lab results., CBC:   Lab Results   Component Value Date    WBC 15.66 (H) 01/03/2024    HGB 11.2 (L) 01/03/2024    HCT 34.3 (L) 01/03/2024    MCV 93 01/03/2024     01/03/2024    RBC 3.68 (L) 01/03/2024    MCH 30.4 01/03/2024    MCHC 32.7 01/03/2024    RDW 13.2 01/03/2024    MPV 10.2 01/03/2024   , CMP:   Lab Results   Component Value Date    SODIUM 137 01/03/2024    K 4.1 01/03/2024     01/03/2024    CO2 23 01/03/2024    BUN 10 01/03/2024    CREATININE 0.56 (L) 01/03/2024    CALCIUM 8.7 01/03/2024    EGFR 125 01/03/2024     Imaging and other studies: I have personally reviewed pertinent reports.   and I have personally reviewed pertinent films in PACS

## 2024-01-03 NOTE — RESPIRATORY THERAPY NOTE
"RT Protocol Note  Domenica De Los Santos 28 y.o. female MRN: [id number]  Unit/Bed#: -01 Encounter: [id number]    Assessment    Principal Problem:    Respiratory failure with hypoxia and hypercapnia (HCC)  Active Problems:    Severe asthma with exacerbation    Severe sepsis (HCC)    Lactic acidosis    Anxiety and depression    Fracture of one rib of right side  Subjective Data: awake and alerrt    Objective    Physical Exam:   Assessment Type: Post-treatment  General Appearance: Alert, Awake  Respiratory Pattern: Normal  Chest Assessment: Chest expansion symmetrical  Bilateral Breath Sounds: Expiratory wheezes, Diminished  O2 Device: ra    Vitals:  Blood pressure 119/77, pulse 104, temperature 97.7 °F (36.5 °C), resp. rate 18, height 5' 3\" (1.6 m), weight 104 kg (230 lb), SpO2 96%.    Results from last 7 days   Lab Units 01/01/24  1547   PH ART  7.258*   PCO2 ART mm Hg 31.2*   PO2 ART mm Hg 133.7*   HCO3 ART mmol/L 13.6*   BASE EXC ART mmol/L -12.2   O2 CONTENT ART mL/dL 19.1   O2 HGB, ARTERIAL % 97.2*   ABG SOURCE  Radial, Left   ELIANA TEST  Yes     O2 Device: ra     Plan    Respiratory Plan: Mild Distress pathway        Resp Comments: will cont w txs as TID. pt has mild ex wheezes pre and post tx. pt c/o of cough no distress at this time. spo2 96 on ra   "

## 2024-01-03 NOTE — ASSESSMENT & PLAN NOTE
Patient presented to the ED with complaints of worsening shortness of breath with an ongoing, worsening cough that she originally developed after being treated for pneumonia in October.  Initially, patient required BiPAP and was admitted to the ICU.  Now on RA.  Acute respiratory failure in setting of asthma exacerbation. Which was possibly due to being around cats recently.  CTA Chest (1/1/24): No PE.  No acute pulmonary abnormality.  Flu/RSV/Covid and RP2 panels are all negative. Procalcitonin normal.  Pulmonology consult, appreciate input  Received IV Solu-Medrol since admission, will be transitioned to Prednisone 40 mg daily x 10 days and then stop.  Continue Xopenex/Atrovent  Recommending ICS/LABA on discharge  Nebulizer machine and medications on discharge

## 2024-01-04 LAB
DME PARACHUTE DELIVERY DATE ACTUAL: NORMAL
DME PARACHUTE DELIVERY DATE REQUESTED: NORMAL
DME PARACHUTE ITEM DESCRIPTION: NORMAL
DME PARACHUTE ORDER STATUS: NORMAL
DME PARACHUTE SUPPLIER NAME: NORMAL
DME PARACHUTE SUPPLIER PHONE: NORMAL

## 2024-01-04 NOTE — UTILIZATION REVIEW
NOTIFICATION OF ADMISSION DISCHARGE   This is a Notification of Discharge from [facility]. Please be advised that this patient has been discharge from our facility. Below you will find the admission and discharge date and time including the patient’s disposition.   UTILIZATION REVIEW CONTACT:  Tigist Rose  Utilization   Network Utilization Review Department  Phone: [phone] x carefully listen to the prompts. All voicemails are confidential.  Email: [email or web address]     ADMISSION INFORMATION  PRESENTATION DATE: 1/1/2024  9:32 AM  OBERVATION ADMISSION DATE:   INPATIENT ADMISSION DATE: 1/1/24  1:23 PM   DISCHARGE DATE: 1/3/2024 12:02 PM   DISPOSITION:Home/Self Care    Network Utilization Review Department  ATTENTION: Please call with any questions or concerns to [phone] and carefully listen to the prompts so that you are directed to the right person. All voicemails are confidential.   For Discharge needs, contact Care Management DC Support Team at [phone] opt. 2  Send all requests for admission clinical reviews, approved or denied determinations and any other requests to dedicated fax number below belonging to the campus where the patient is receiving treatment. List of dedicated fax numbers for the Facilities:  FACILITY NAME UR FAX NUMBER   ADMISSION DENIALS (Administrative/Medical Necessity) [phone]   DISCHARGE SUPPORT TEAM ([facility]) [phone]   PARENT CHILD HEALTH (Maternity/NICU/Pediatrics) [phone]   [facility] [phone]   [facility] [phone]   [facility] [phone]   [facility] [phone]   [facility] [phone]   [facility] [phone]   [facility] [phone]   [facility] [address] [facility] [phone]    [facility] [phone]   [facility], [facility] [phone]   [facility] [phone]

## 2024-01-07 LAB
BACTERIA BLD CULT: NORMAL
BACTERIA BLD CULT: NORMAL